# Patient Record
Sex: FEMALE | Race: WHITE | NOT HISPANIC OR LATINO | Employment: FULL TIME | URBAN - METROPOLITAN AREA
[De-identification: names, ages, dates, MRNs, and addresses within clinical notes are randomized per-mention and may not be internally consistent; named-entity substitution may affect disease eponyms.]

---

## 2017-01-25 ENCOUNTER — ALLSCRIPTS OFFICE VISIT (OUTPATIENT)
Dept: OTHER | Facility: OTHER | Age: 52
End: 2017-01-25

## 2017-01-25 DIAGNOSIS — M25.50 PAIN IN JOINT: ICD-10-CM

## 2017-01-25 DIAGNOSIS — E78.5 HYPERLIPIDEMIA: ICD-10-CM

## 2017-01-25 DIAGNOSIS — K21.9 GASTRO-ESOPHAGEAL REFLUX DISEASE WITHOUT ESOPHAGITIS: ICD-10-CM

## 2017-01-25 DIAGNOSIS — M46.1 SACROILIITIS, NOT ELSEWHERE CLASSIFIED (HCC): ICD-10-CM

## 2017-01-25 DIAGNOSIS — M51.36 OTHER INTERVERTEBRAL DISC DEGENERATION, LUMBAR REGION: ICD-10-CM

## 2017-01-27 ENCOUNTER — LAB CONVERSION - ENCOUNTER (OUTPATIENT)
Dept: OTHER | Facility: OTHER | Age: 52
End: 2017-01-27

## 2017-01-27 LAB
A/G RATIO (HISTORICAL): 1.5 (CALC) (ref 1–2.5)
ALBUMIN SERPL BCP-MCNC: 4.2 G/DL (ref 3.6–5.1)
ALP SERPL-CCNC: 73 U/L (ref 33–115)
ALT SERPL W P-5'-P-CCNC: 20 U/L (ref 6–29)
AST SERPL W P-5'-P-CCNC: 20 U/L (ref 10–35)
BASOPHILS # BLD AUTO: 1.2 %
BASOPHILS # BLD AUTO: 91 CELLS/UL (ref 0–200)
BILIRUB SERPL-MCNC: 0.5 MG/DL (ref 0.2–1.2)
BUN SERPL-MCNC: 18 MG/DL (ref 7–25)
BUN/CREA RATIO (HISTORICAL): NORMAL (CALC) (ref 6–22)
CALCIUM SERPL-MCNC: 9.3 MG/DL (ref 8.6–10.2)
CHLORIDE SERPL-SCNC: 104 MMOL/L (ref 98–110)
CHOLEST SERPL-MCNC: 193 MG/DL (ref 125–200)
CHOLEST/HDLC SERPL: 2.7 (CALC)
CO2 SERPL-SCNC: 27 MMOL/L (ref 20–31)
CREAT SERPL-MCNC: 0.92 MG/DL (ref 0.5–1.1)
DEPRECATED RDW RBC AUTO: 15.2 % (ref 11–15)
EGFR AFRICAN AMERICAN (HISTORICAL): 85 ML/MIN/1.73M2
EGFR-AMERICAN CALC (HISTORICAL): 73 ML/MIN/1.73M2
EOSINOPHIL # BLD AUTO: 0.8 %
EOSINOPHIL # BLD AUTO: 61 CELLS/UL (ref 15–500)
ERYTHROCYTE SEDIMENTATION RATE (HISTORICAL): 9 MM/H
GAMMA GLOBULIN (HISTORICAL): 2.8 G/DL (CALC) (ref 1.9–3.7)
GLUCOSE (HISTORICAL): 90 MG/DL (ref 65–99)
HCT VFR BLD AUTO: 39 % (ref 35–45)
HDLC SERPL-MCNC: 71 MG/DL
HGB BLD-MCNC: 12.3 G/DL (ref 11.7–15.5)
LDL CHOLESTEROL (HISTORICAL): 110 MG/DL (CALC)
LYME IGG/IGM AB (HISTORICAL): NORMAL INDEX
LYMPHOCYTES # BLD AUTO: 1892 CELLS/UL (ref 850–3900)
LYMPHOCYTES # BLD AUTO: 24.9 %
MCH RBC QN AUTO: 24.9 PG (ref 27–33)
MCHC RBC AUTO-ENTMCNC: 31.4 G/DL (ref 32–36)
MCV RBC AUTO: 79.2 FL (ref 80–100)
MONOCYTES # BLD AUTO: 555 CELLS/UL (ref 200–950)
MONOCYTES (HISTORICAL): 7.3 %
NEUTROPHILS # BLD AUTO: 5001 CELLS/UL (ref 1500–7800)
NEUTROPHILS # BLD AUTO: 65.8 %
NON-HDL-CHOL (CHOL-HDL) (HISTORICAL): 122 MG/DL (CALC)
PLATELET # BLD AUTO: 324 THOUSAND/UL (ref 140–400)
PMV BLD AUTO: 8.6 FL (ref 7.5–12.5)
POTASSIUM SERPL-SCNC: 4.4 MMOL/L (ref 3.5–5.3)
RBC # BLD AUTO: 4.93 MILLION/UL (ref 3.8–5.1)
SODIUM SERPL-SCNC: 138 MMOL/L (ref 135–146)
TOTAL PROTEIN (HISTORICAL): 7 G/DL (ref 6.1–8.1)
TRIGL SERPL-MCNC: 58 MG/DL
TSH SERPL DL<=0.05 MIU/L-ACNC: 1.35 MIU/L
WBC # BLD AUTO: 7.6 THOUSAND/UL (ref 3.8–10.8)

## 2017-07-11 ENCOUNTER — ALLSCRIPTS OFFICE VISIT (OUTPATIENT)
Dept: OTHER | Facility: OTHER | Age: 52
End: 2017-07-11

## 2017-11-28 ENCOUNTER — GENERIC CONVERSION - ENCOUNTER (OUTPATIENT)
Dept: OTHER | Facility: OTHER | Age: 52
End: 2017-11-28

## 2017-11-28 ENCOUNTER — HOSPITAL ENCOUNTER (OUTPATIENT)
Dept: RADIOLOGY | Age: 52
Discharge: HOME | End: 2017-11-28
Payer: COMMERCIAL

## 2017-11-28 DIAGNOSIS — Z12.31 ENCOUNTER FOR SCREENING MAMMOGRAM FOR MALIGNANT NEOPLASM OF BREAST: ICD-10-CM

## 2017-11-28 PROCEDURE — G0202 SCR MAMMO BI INCL CAD: HCPCS

## 2017-12-19 ENCOUNTER — ALLSCRIPTS OFFICE VISIT (OUTPATIENT)
Dept: OTHER | Facility: OTHER | Age: 52
End: 2017-12-19

## 2017-12-21 NOTE — PROGRESS NOTES
Assessment   1  Cough (786 2) (R05)    Plan   Cough    · Benzonatate 100 MG Oral Capsule; TAKE 1 CAPSULE 3 TIMES DAILY AS    NEEDED    Discussion/Summary      Cough with patient plan to prescribe Benzonatate to help with cough control instructed to call if not better in 72 hours or if symptoms worsen instructed to call for problems or concerns in the interim  The patient was counseled regarding instructions for management,-- risk factor reductions,-- impressions,-- risks and benefits of treatment options  Possible side effects of new medications were reviewed with the patient/guardian today  The treatment plan was reviewed with the patient/guardian  The patient/guardian understands and agrees with the treatment plan      Chief Complaint   1  Cold Symptoms    History of Present Illness   HPI: 50year old female presenting with a scratchy throat and dry nonproductive cough for three weeks  Patient reports having a viral illness that resolved on its own but cough remained  Cold Symptoms: Bronwyn Stanton presents with complaints of cold symptoms  Associated symptoms include nasal congestion,-- scratchy throat-- and-- dry cough, but-- no sneezing,-- no runny nose,-- no post nasal drainage,-- no sore throat,-- no hoarseness,-- no productive cough,-- no facial pressure,-- no facial pain,-- no headache,-- no plugged ear(s),-- no ear pain,-- no swollen lymph nodes,-- no wheezing,-- no shortness of breath,-- no fatigue,-- no weakness,-- no nausea,-- no vomiting,-- no diarrhea,-- no fever-- and-- no chills  Review of Systems        Constitutional: No fever, no chills, feels well, no tiredness, no recent weight gain or loss  ENT: nasal congestion  Cardiovascular: no complaints of slow or fast heart rate, no chest pain, no palpitations, no leg claudication or lower extremity edema  Respiratory: cough  Breasts: no complaints of breast pain, breast lump or nipple discharge  Gastrointestinal: no complaints of abdominal pain, no constipation, no nausea or diarrhea, no vomiting, no bloody stools  Genitourinary: no complaints of dysuria, no incontinence, no pelvic pain, no dysmenorrhea, no vaginal discharge or abnormal vaginal bleeding  Musculoskeletal: no complaints of arthralgia, no myalgia, no joint swelling or stiffness, no limb pain or swelling  Integumentary: no complaints of skin rash or lesion, no itching or dry skin, no skin wounds  Neurological: no complaints of headache, no confusion, no numbness or tingling, no dizziness or fainting  Active Problems   1  Allergic rhinitis (477 9) (J30 9)   2  Annual physical exam (V70 0) (Z00 00)   3  Arthralgia, unspecified joint (719 40) (M25 50)   4  Cervical cancer (180 9) (C53 9)   5  Dermatitis (692 9) (L30 9)   6  Disc degeneration, lumbar (722 52) (M51 36)   7  Encounter for screening mammogram for breast cancer (V76 12) (Z12 31)   8  Esophageal reflux (530 81) (K21 9)   9  Fatigue (780 79) (R53 83)   10  General medical exam (V70 9) (Z00 00)   11  Herniated cervical disc (722 0) (M50 20)   12  Hyperlipidemia (272 4) (E78 5)   13  Palpitations (785 1) (R00 2)   14  Rosacea (695 3) (L71 9)   15  Sacroiliitis (720 2) (M46 1)   16  Spondylosis of lumbar region without myelopathy or radiculopathy (721 3) (M47 816)   17  Throat congestion (784 99) (R68 89)    Past Medical History   1  History of lipoma (V13 89) (Z86 018)  Active Problems And Past Medical History Reviewed: The active problems and past medical history were reviewed and updated today  Family History   Mother    1  Family history of Breast Cancer (V16 3)   2  Family history of Diabetes Mellitus (V18 0)   3  Family history of Ovarian Cancer (V16 41)  Father    4  Family history of Diabetes Mellitus (V18 0)  Family History    5  Family history of Hypertension (V17 49)  Family History Reviewed: The family history was reviewed and updated today  Social History    · Almost always uses seat belt   · Daily Coffee Consumption (___ Cups/Day)   · Denied: History of Daily Cola Consumption (___ Cans/Day)   · Daily Tea Consumption (___ Cups/Day)   · Dental care, regularly   · Exercise: Walking   · Exercise: Yoga   · Full-time employment   ·    · Never A Smoker   · No illicit drug use   · Pets/Animals: Dog   · Pets/Animals: Other   · Sexually active   · Social alcohol use (Z78 9)   · Some college   · Tea   · Water intake, adequate (per day)  The social history was reviewed and updated today  Surgical History   1  History of Cervical Conization By Cold Knife   2  History of  Section   3  History of Radical Synovectomy Of Tendon Sheath Of Palm Or Finger  Surgical History Reviewed: The surgical history was reviewed and updated today  Current Meds    1  Ibuprofen 600 MG Oral Tablet Recorded   2  Multivitamins Oral Capsule Recorded     The medication list was reviewed and updated today  Allergies   1  Codeine Sulfate TABS   2  Codeine Derivatives  3  Mold   4  Trees    Vitals    Recorded: 17JQN3941 01:36PM   Temperature 98 4 F   Heart Rate 76   Systolic 241   Diastolic 70   Height 5 ft 5 in   Weight 185 lb    BMI Calculated 30 79   BSA Calculated 1 91     Physical Exam        Constitutional      General appearance: No acute distress, well appearing and well nourished  Eyes      Conjunctiva and lids: No swelling, erythema or discharge  Pupils and irises: Equal, round and reactive to light  Ears, Nose, Mouth, and Throat      External inspection of ears and nose: Normal        Otoscopic examination: Tympanic membranes translucent with normal light reflex  Canals patent without erythema  Nasal mucosa, septum, and turbinates: Normal without edema or erythema  Oropharynx: Normal with no erythema, edema, exudate or lesions         Pulmonary      Respiratory effort: No increased work of breathing or signs of respiratory distress  Auscultation of lungs: Clear to auscultation  -- dry nonproductive bronchial cough  Cardiovascular      Auscultation of heart: Normal rate and rhythm, normal S1 and S2, without murmurs  Examination of extremities for edema and/or varicosities: Normal        Abdomen      Abdomen: Non-tender, no masses  Musculoskeletal      Digits and nails: Normal without clubbing or cyanosis  Skin      Skin and subcutaneous tissue: Normal without rashes or lesions  Neurologic      Reflexes: 2+ and symmetric  Psychiatric      Orientation to person, place, and time: Normal        Mood and affect: Normal           Future Appointments      Date/Time Provider Specialty Site   02/06/2018 08:00 AM LAMIN Flores   [address] [facility]     Signatures    Electronically signed by : Damian Reed, [address] [place] [address]; Dec 19 2017  2:50PM EST                       (Co-author)     Electronically signed by : LAMIN Dior ; Dec 20 2017  7:24AM EST                       (Author)

## 2018-01-13 VITALS
WEIGHT: 190.38 LBS | RESPIRATION RATE: 16 BRPM | TEMPERATURE: 98.9 F | DIASTOLIC BLOOD PRESSURE: 70 MMHG | HEIGHT: 65 IN | HEART RATE: 72 BPM | SYSTOLIC BLOOD PRESSURE: 116 MMHG | BODY MASS INDEX: 31.72 KG/M2

## 2018-01-13 NOTE — PROGRESS NOTES
Assessment    1  General medical exam (V70 9) (Z00 00)   2  Hyperlipidemia (272 4) (E78 5)    Plan  General medical exam    · (1) CBC/PLT/DIFF; Status:Resulted - Requires Verification;   Done: [id number] 09:53AM   · (1) COMPREHENSIVE METABOLIC PANEL; Status:Resulted - Requires Verification;    Done: 78WIF3549 09:53AM   · (1) LIPID PANEL, FASTING; Status:Resulted - Requires Verification;   Done: 76FTE0134  09:53AM   · (1) TSH; Status:Active; Requested ZFK:[id number];     Discussion/Summary    #1 physical - normal  #2 elevated cholesterol - recheck labs  #3 health maintenance - I reviewed health maintenance issues including but not limited to weight loss, diet and exercise strategies  Discussed need for weight loss nightly family history of diabetes and coronary artery disease  Up-to-date with GYN  Recheck one year or when necessary  Patient to call for problems or concerns in the interim  The treatment plan was reviewed with the patient/guardian  The patient/guardian understands and agrees with the treatment plan   The patient was counseled regarding instructions for management, risk factor reductions, prognosis, patient and family education, impressions, risks and benefits of treatment options, importance of compliance with treatment  Chief Complaint  6 month check up      History of Present Illness  above - here for PE  - pt feels well  Trying to watch what she eats and tries to exercise more  - pt denies CP, palp, lightheadedness or other CV symptoms  - still with scattered sore joints    -up to date with Gyn (November) and mammo  Refuses flu shot  - I reviewed PMHx, PSHx, SocHx and Fam Hx with pt  Review of Systems    Constitutional: No fever, no chills, feels well, no tiredness, no recent weight gain or weight loss  Eyes: No complaints of eye pain, no red eyes, no eyesight problems, no discharge, no dry eyes, no itching of eyes     ENT: no complaints of earache, no loss of hearing, no nose bleeds, no nasal discharge, no sore throat, no hoarseness  Cardiovascular: No complaints of slow heart rate, no fast heart rate, no chest pain, no palpitations, no leg claudication, no lower extremity edema  Respiratory: No complaints of shortness of breath, no wheezing, no cough, no SOB on exertion, no orthopnea, no PND  Gastrointestinal: No complaints of abdominal pain, no constipation, no nausea or vomiting, no diarrhea, no bloody stools  Genitourinary: No complaints of dysuria, no incontinence, no pelvic pain, no dysmenorrhea, no vaginal discharge or bleeding  Musculoskeletal: No complaints of arthralgias, no myalgias, no joint swelling or stiffness, no limb pain or swelling  Integumentary: No complaints of skin rash or lesions, no itching, no skin wounds, no breast pain or lump  Neurological: No complaints of headache, no confusion, no convulsions, no numbness, no dizziness or fainting, no tingling, no limb weakness, no difficulty walking  Psychiatric: Not suicidal, no sleep disturbance, no anxiety or depression, no change in personality, no emotional problems  Endocrine: No complaints of proptosis, no hot flashes, no muscle weakness, no deepening of the voice, no feelings of weakness  Hematologic/Lymphatic: No complaints of swollen glands, no swollen glands in the neck, does not bleed easily, does not bruise easily  Active Problems    1  Abdominal pain, epigastric (789 06) (R10 13)   2  Acute pharyngitis (462) (J02 9)   3  Acute upper respiratory infection (465 9) (J06 9)   4  Allergic rhinitis (477 9) (J30 9)   5  Cervical cancer (180 9) (C53 9)   6  Cough (786 2) (R05)   7  Dermatitis (692 9) (L30 9)   8  Disc degeneration, lumbar (722 52) (M51 36)   9  Esophageal reflux (530 81) (K21 9)   10  Fatigue (780 79) (R53 83)   11  Herniated cervical disc (722 0) (M50 20)   12  Hyperlipidemia (272 4) (E78 5)   13  Lipoma (214 9) (D17 9)   14  Lower back pain (724 2) (M54 5)   15   Myofascial pain syndrome (729 1) (M79 1)   16  Rosacea (695 3) (L71 9)   17  Sacroiliitis (720 2) (M46 1)   18  Spondylosis of lumbar region without myelopathy or radiculopathy (721 3) (M47 816)    Past Medical History    The active problems and past medical history were reviewed and updated today  Surgical History    1  History of Cervical Conization By Cold Knife   2  History of  Section   3  History of Radical Synovectomy Of Tendon Sheath Of Palm Or Finger    Family History    1  Family history of Breast Cancer (V16 3)   2  Family history of Diabetes Mellitus (V18 0)   3  Family history of Ovarian Cancer (V16 41)    4  Family history of Diabetes Mellitus (V18 0)    5  Family history of Hypertension (V17 49)    The family history was reviewed and updated today  Social History    · Alcohol Use (History)   · Daily Coffee Consumption (___ Cups/Day)   · Denied: History of Daily Cola Consumption (___ Cans/Day)   · Daily Tea Consumption (___ Cups/Day)   · Marital History -  (V6 03)   · Never A Smoker  The social history was reviewed and updated today  Current Meds   1  Benzonatate 200 MG Oral Capsule; TAKE 1 CAPSULE 3 TIMES DAILY AS NEEDED; Therapy: [id number] to (Evaluate:2015)  Requested for: 95EJS6117; Last   Rx:2015 Ordered   2  Ibuprofen 600 MG Oral Tablet Recorded   3  Multivitamins Oral Capsule Recorded    The medication list was reviewed and updated today  Allergies    1  Codeine Sulfate TABS   2  Codeine Derivatives    Vitals  Vital Signs [Data Includes: Current Encounter]    Recorded: 2016 09:15AM   Temperature 97 2 F   Heart Rate 80   Systolic 230   Diastolic 64   Height 5 ft 5 in   Weight 190 lb    BMI Calculated 31 62   BSA Calculated 1 94     Physical Exam    Constitutional   General appearance: No acute distress, well appearing and well nourished  Head and Face   Head and face: Normal     Eyes   Conjunctiva and lids: No swelling, erythema or discharge  Pupils and irises: Equal, round, reactive to light  Ophthalmoscopic examination: Normal fundi and optic discs  Ears, Nose, Mouth, and Throat   External inspection of ears and nose: Normal     Otoscopic examination: Tympanic membranes translucent with normal light reflex  Canals patent without erythema  Nasal mucosa, septum, and turbinates: Normal without edema or erythema  Lips, teeth, and gums: Normal, good dentition  Oropharynx: Normal with no erythema, edema, exudate or lesions  Neck   Neck: Supple, symmetric, trachea midline, no masses  Thyroid: Normal, no thyromegaly  Pulmonary   Respiratory effort: No increased work of breathing or signs of respiratory distress  Auscultation of lungs: Clear to auscultation  Cardiovascular   Auscultation of heart: Normal rate and rhythm, normal S1 and S2, no murmurs  Carotid pulses: 2+ bilaterally  Abdominal aorta: Normal     Pedal pulses: 2+ bilaterally  Peripheral vascular exam: Normal     Examination of extremities for edema and/or varicosities: Normal     Abdomen   Abdomen: Non-tender, no masses  Liver and spleen: No hepatomegaly or splenomegaly  Lymphatic   Palpation of lymph nodes in neck: No lymphadenopathy  Palpation of lymph nodes in other areas: No lymphadenopathy  Musculoskeletal   Gait and station: Normal     Digits and nails: Normal without clubbing or cyanosis  Joints, bones, and muscles: Normal     Range of motion: Normal     Muscle strength/tone: Normal     Skin   Skin and subcutaneous tissue: Normal without rashes or lesions  Neurologic   Cranial nerves: Cranial nerves II-XII intact  Cortical function: Normal mental status  Reflexes: 2+ and symmetric  Sensation: No sensory loss  Coordination: Normal finger to nose and heel to shin  Psychiatric   Judgment and insight: Normal     Orientation to person, place, and time: Normal     Recent and remote memory: Intact      Mood and affect: Normal  Results/Data  Encounter Results   (1) CBC/PLT/DIFF [id number] 09:53AM Marisa Bermeo     Test Name Result Flag Reference   WHITE BLOOD CELL COUNT 6 8 Thousand/uL  3 8-10 8   RED BLOOD CELL COUNT 5 03 Million/uL  3 80-5 10   HEMOGLOBIN 12 6 g/dL  11 7-15 5   HEMATOCRIT 40 6 %  35 0-45 0   MCV 80 6 fL  80 0-100 0   MCH 25 0 pg L 27 0-33 0   MCHC 31 1 g/dL L 32 0-36 0   RDW 14 8 %  11 0-15 0   PLATELET COUNT 724 Thousand/uL  140-400   MPV 8 4 fL  7 5-11 5   ABSOLUTE NEUTROPHILS 4542 cells/uL  3739-8765   ABSOLUTE LYMPHOCYTES 1612 cells/uL  850-3900   ABSOLUTE MONOCYTES 564 cells/uL  200-950   ABSOLUTE EOSINOPHILS 54 cells/uL     ABSOLUTE BASOPHILS 27 cells/uL  0-200   NEUTROPHILS 66 8 %     LYMPHOCYTES 23 7 %     MONOCYTES 8 3 %     EOSINOPHILS 0 8 %     BASOPHILS 0 4 %       PHQ-2 Adult Depression Screening 21Jan2016 09:18AM User, Ahs     Test Name Result Flag Reference   PHQ-2 Adult Depression Score 0     Q1: 0, Q2: 0   PHQ-2 Adult Depression Screening Negative         Future Appointments    Date/Time Provider Specialty Site   01/25/2017 08:00 AM [facility] Hooper [facility]     Signatures   Electronically signed by : BONY Bullard ; Jan 22 2016  7:06AM EST                       (Author)

## 2018-01-14 VITALS
RESPIRATION RATE: 14 BRPM | TEMPERATURE: 98 F | BODY MASS INDEX: 30.99 KG/M2 | WEIGHT: 186 LBS | HEIGHT: 65 IN | HEART RATE: 72 BPM | DIASTOLIC BLOOD PRESSURE: 74 MMHG | SYSTOLIC BLOOD PRESSURE: 112 MMHG

## 2018-01-22 VITALS
SYSTOLIC BLOOD PRESSURE: 102 MMHG | WEIGHT: 185 LBS | HEIGHT: 65 IN | DIASTOLIC BLOOD PRESSURE: 70 MMHG | HEART RATE: 76 BPM | TEMPERATURE: 98.4 F | BODY MASS INDEX: 30.82 KG/M2

## 2018-02-06 ENCOUNTER — OFFICE VISIT (OUTPATIENT)
Dept: FAMILY MEDICINE CLINIC | Facility: CLINIC | Age: 53
End: 2018-02-06
Payer: COMMERCIAL

## 2018-02-06 VITALS
TEMPERATURE: 98.7 F | BODY MASS INDEX: 32.09 KG/M2 | WEIGHT: 192.6 LBS | SYSTOLIC BLOOD PRESSURE: 120 MMHG | HEART RATE: 74 BPM | DIASTOLIC BLOOD PRESSURE: 72 MMHG | RESPIRATION RATE: 16 BRPM | HEIGHT: 65 IN

## 2018-02-06 DIAGNOSIS — E55.9 VITAMIN D DEFICIENCY: ICD-10-CM

## 2018-02-06 DIAGNOSIS — M51.36 DISC DEGENERATION, LUMBAR: ICD-10-CM

## 2018-02-06 DIAGNOSIS — Z13.83 SCREENING FOR CARDIOVASCULAR, RESPIRATORY, AND GENITOURINARY DISEASES: ICD-10-CM

## 2018-02-06 DIAGNOSIS — Z13.6 SCREENING FOR CARDIOVASCULAR, RESPIRATORY, AND GENITOURINARY DISEASES: ICD-10-CM

## 2018-02-06 DIAGNOSIS — K21.9 GASTROESOPHAGEAL REFLUX DISEASE, ESOPHAGITIS PRESENCE NOT SPECIFIED: ICD-10-CM

## 2018-02-06 DIAGNOSIS — Z13.89 SCREENING FOR CARDIOVASCULAR, RESPIRATORY, AND GENITOURINARY DISEASES: ICD-10-CM

## 2018-02-06 DIAGNOSIS — E78.2 MIXED HYPERLIPIDEMIA: ICD-10-CM

## 2018-02-06 DIAGNOSIS — Z12.11 SCREENING FOR COLON CANCER: ICD-10-CM

## 2018-02-06 DIAGNOSIS — M47.816 SPONDYLOSIS OF LUMBAR REGION WITHOUT MYELOPATHY OR RADICULOPATHY: ICD-10-CM

## 2018-02-06 DIAGNOSIS — Z00.00 ANNUAL PHYSICAL EXAM: Primary | ICD-10-CM

## 2018-02-06 PROBLEM — F41.9 ANXIETY: Status: ACTIVE | Noted: 2018-02-06

## 2018-02-06 PROCEDURE — 99396 PREV VISIT EST AGE 40-64: CPT | Performed by: FAMILY MEDICINE

## 2018-02-06 RX ORDER — DEXLANSOPRAZOLE 60 MG/1
CAPSULE, DELAYED RELEASE ORAL
COMMUNITY
Start: 2017-07-11 | End: 2019-02-27 | Stop reason: ALTCHOICE

## 2018-02-06 RX ORDER — OSELTAMIVIR PHOSPHATE 75 MG/1
CAPSULE ORAL
COMMUNITY
Start: 2017-12-19 | End: 2019-02-27 | Stop reason: ALTCHOICE

## 2018-02-06 RX ORDER — BENZONATATE 100 MG/1
1 CAPSULE ORAL 3 TIMES DAILY PRN
COMMUNITY
Start: 2017-12-19 | End: 2018-09-10 | Stop reason: SDUPTHER

## 2018-02-06 RX ORDER — IBUPROFEN 600 MG/1
TABLET ORAL
COMMUNITY

## 2018-02-06 NOTE — PROGRESS NOTES
Assessment/Plan:    Esophageal reflux  Stable off of medications  Continue diet and watch  Disc degeneration, lumbar  Continue follow-up with pain management    Spondylosis of lumbar region without myelopathy or radiculopathy  Continue follow-up with pain management    Hyperlipidemia  Check labs    Anxiety  No evidence of depression  Refer for counseling  Diagnoses and all orders for this visit:    Annual physical exam    Gastroesophageal reflux disease, esophagitis presence not specified  -     CBC and differential; Future  -     Comprehensive metabolic panel; Future    Disc degeneration, lumbar    Spondylosis of lumbar region without myelopathy or radiculopathy    Mixed hyperlipidemia  -     Lipid panel; Future  -     TSH, 3rd generation; Future    Screening for colon cancer  -     Ambulatory referral to Gastroenterology; Future    Screening for cardiovascular, respiratory, and genitourinary diseases  -     Comprehensive metabolic panel; Future  -     Lipid panel; Future  -     TSH, 3rd generation; Future    Vitamin D deficiency  Comments:  Check labs  Orders:  -     Vitamin D 1,25 dihydroxy; Future      health maintenance:  Patient referred for colonoscopy  Up-to-date with mammogram and will schedule a follow-up appointment with her gyn  Recheck 1 year or as needed  Patient call for problems or concerns in the interim    Subjective:      Patient ID: Mirna Medina is a 50 y o  female  49 yo female here for yearly PE and f/u several issues  - pt with increased stress  Presently helping to raise her 2 step grandchildren  "this is not where I thought I would be at 50"  PHQ-2 done  Pt denies depression/anhedonia but admits to some anxiety and intermittent irritability  Pt is interested in restarting counseling    - pt continues to f/u with pain management re: chronic low back pain  Continues to have periodic injections for her DDD/spondylosis (last injection 1/29)  No pain/weakness in legs   No change in bowel or bladder  - not exercising regularly  No Cp, lightheadedness or other CV symptoms with or without exertion  - having some menopausal symptoms  Due fo Gyn eval  Up to date with mammo ()  - due for colonoscopy        The following portions of the patient's history were reviewed and updated as appropriate:   She  has a past medical history of Lipoma  She  does not have any pertinent problems on file  She  has a past surgical history that includes Cervical conization w/ laser;  section; and Synovectomy  Her family history includes Breast cancer in her mother; Diabetes in her father and mother; Hypertension in her family; Ovarian cancer in her mother  She  reports that she has never smoked  She does not have any smokeless tobacco history on file  She reports that she drinks alcohol  She reports that she does not use drugs  Current Outpatient Prescriptions   Medication Sig Dispense Refill    ibuprofen (MOTRIN) 600 mg tablet Take by mouth      Multiple Vitamin (MULTIVITAMINS PO) Take by mouth      benzonatate (TESSALON PERLES) 100 mg capsule Take 1 capsule by mouth 3 (three) times a day as needed      dexlansoprazole (DEXILANT) 60 MG capsule Take by mouth      oseltamivir (TAMIFLU) 75 mg capsule        No current facility-administered medications for this visit  She is allergic to codeine       Review of Systems   Constitutional: Negative  HENT: Negative  Eyes: Negative  Respiratory: Negative  Cardiovascular: Negative  Gastrointestinal: Negative  Endocrine: Negative  Genitourinary: Negative  Musculoskeletal: Positive for back pain  Negative for gait problem, myalgias, neck pain and neck stiffness  Skin: Negative  Allergic/Immunologic: Negative  Neurological: Negative  Hematological: Negative  Psychiatric/Behavioral: Positive for sleep disturbance  Negative for agitation, dysphoric mood, hallucinations, self-injury and suicidal ideas   The patient is nervous/anxious  The patient is not hyperactive  Objective:     Physical Exam   Constitutional: She is oriented to person, place, and time  She appears well-developed and well-nourished  HENT:   Head: Normocephalic and atraumatic  Right Ear: External ear normal    Left Ear: External ear normal    Nose: Nose normal    Mouth/Throat: Oropharynx is clear and moist  No oropharyngeal exudate  Eyes: Conjunctivae and EOM are normal  Pupils are equal, round, and reactive to light  Neck: Normal range of motion  Neck supple  No JVD present  No thyromegaly present  Cardiovascular: Normal rate, regular rhythm and intact distal pulses  Exam reveals friction rub  Exam reveals no gallop  No murmur heard  Pulmonary/Chest: Effort normal and breath sounds normal    Abdominal: Soft  She exhibits no distension  There is no tenderness  Musculoskeletal:        Arms:  Lymphadenopathy:     She has no cervical adenopathy  Neurological: She is alert and oriented to person, place, and time  She has normal reflexes  She exhibits normal muscle tone  Coordination normal    Skin: Skin is warm and dry  She is not diaphoretic  Psychiatric: Her behavior is normal  Judgment and thought content normal    PHQ-9 = 0  Rest as above   Vitals reviewed

## 2018-02-06 NOTE — LETTER
Good Morning Lia,     I have a 51 yo female with some increased stress/anxiety recently  Pt is looking for a counselor who has experience in family stress, especially in regards to family members with addiction  Do you have anyone in mind? Could you speak with her?   Thanks    [facility]

## 2018-03-09 ENCOUNTER — TELEPHONE (OUTPATIENT)
Dept: FAMILY MEDICINE CLINIC | Facility: CLINIC | Age: 53
End: 2018-03-09

## 2018-03-09 DIAGNOSIS — R79.9 ABNORMAL BLOOD CHEMISTRY: Primary | ICD-10-CM

## 2018-03-09 LAB
25(OH)D3 SERPL-MCNC: 25 NG/ML (ref 30–100)
ALBUMIN SERPL-MCNC: 4.1 G/DL (ref 3.6–5.1)
ALBUMIN/GLOB SERPL: 1.4 (CALC) (ref 1–2.5)
ALP SERPL-CCNC: 72 U/L (ref 33–130)
ALT SERPL-CCNC: 43 U/L (ref 6–29)
AST SERPL-CCNC: 25 U/L (ref 10–35)
BASOPHILS # BLD AUTO: 29 CELLS/UL (ref 0–200)
BASOPHILS NFR BLD AUTO: 0.4 %
BILIRUB SERPL-MCNC: 0.6 MG/DL (ref 0.2–1.2)
BUN SERPL-MCNC: 14 MG/DL (ref 7–25)
BUN/CREAT SERPL: ABNORMAL (CALC) (ref 6–22)
CALCIUM SERPL-MCNC: 9.2 MG/DL (ref 8.6–10.4)
CHLORIDE SERPL-SCNC: 103 MMOL/L (ref 98–110)
CHOLEST SERPL-MCNC: 215 MG/DL
CHOLEST/HDLC SERPL: 2.7 (CALC)
CO2 SERPL-SCNC: 28 MMOL/L (ref 20–31)
CREAT SERPL-MCNC: 0.99 MG/DL (ref 0.5–1.05)
EOSINOPHIL # BLD AUTO: 29 CELLS/UL (ref 15–500)
EOSINOPHIL NFR BLD AUTO: 0.4 %
ERYTHROCYTE [DISTWIDTH] IN BLOOD BY AUTOMATED COUNT: 15.6 % (ref 11–15)
GLOBULIN SER CALC-MCNC: 2.9 G/DL (CALC) (ref 1.9–3.7)
GLUCOSE SERPL-MCNC: 88 MG/DL (ref 65–99)
HCT VFR BLD AUTO: 38.8 % (ref 35–45)
HDLC SERPL-MCNC: 79 MG/DL
HGB BLD-MCNC: 12.4 G/DL (ref 11.7–15.5)
LDLC SERPL CALC-MCNC: 119 MG/DL (CALC)
LYMPHOCYTES # BLD AUTO: 2074 CELLS/UL (ref 850–3900)
LYMPHOCYTES NFR BLD AUTO: 28.8 %
MCH RBC QN AUTO: 25.6 PG (ref 27–33)
MCHC RBC AUTO-ENTMCNC: 32 G/DL (ref 32–36)
MCV RBC AUTO: 80 FL (ref 80–100)
MONOCYTES # BLD AUTO: 670 CELLS/UL (ref 200–950)
MONOCYTES NFR BLD AUTO: 9.3 %
NEUTROPHILS # BLD AUTO: 4399 CELLS/UL (ref 1500–7800)
NEUTROPHILS NFR BLD AUTO: 61.1 %
NONHDLC SERPL-MCNC: 136 MG/DL (CALC)
PLATELET # BLD AUTO: 389 THOUSAND/UL (ref 140–400)
PMV BLD REES-ECKER: 10 FL (ref 7.5–12.5)
POTASSIUM SERPL-SCNC: 4.6 MMOL/L (ref 3.5–5.3)
PROT SERPL-MCNC: 7 G/DL (ref 6.1–8.1)
RBC # BLD AUTO: 4.85 MILLION/UL (ref 3.8–5.1)
SL AMB EGFR AFRICAN AMERICAN: 77 ML/MIN/1.73M2
SL AMB EGFR NON AFRICAN AMERICAN: 66 ML/MIN/1.73M2
SODIUM SERPL-SCNC: 137 MMOL/L (ref 135–146)
TRIGL SERPL-MCNC: 71 MG/DL
TSH SERPL-ACNC: 1.9 MIU/L
WBC # BLD AUTO: 7.2 THOUSAND/UL (ref 3.8–10.8)

## 2018-04-03 ENCOUNTER — TELEPHONE (OUTPATIENT)
Dept: FAMILY MEDICINE CLINIC | Facility: CLINIC | Age: 53
End: 2018-04-03

## 2018-04-03 NOTE — TELEPHONE ENCOUNTER
PT TOLD TO GET MORE LABS DONE AFTER CHANGING VITAMINS AND MONITORING INTAKE ON IBUPROFEN WILL GO TO [facility] FOR LABS

## 2018-04-04 NOTE — TELEPHONE ENCOUNTER
AST, ALT and ALP are already ordered   Pt just needs to go to [facility] lab - does not need a lab slip

## 2018-04-06 ENCOUNTER — TELEPHONE (OUTPATIENT)
Dept: FAMILY MEDICINE CLINIC | Facility: CLINIC | Age: 53
End: 2018-04-06

## 2018-04-06 DIAGNOSIS — E55.9 VITAMIN D DEFICIENCY: Primary | ICD-10-CM

## 2018-04-06 NOTE — TELEPHONE ENCOUNTER
The order was actually in the computer - I am not sure why they could not see it, but I did re-enter it

## 2018-04-06 NOTE — TELEPHONE ENCOUNTER
She was suppose to repeat Vitamin D, but got to lab and there is not order, could you please put one in, could you mail it to patient

## 2018-04-13 LAB
25(OH)D3 SERPL-MCNC: 27 NG/ML (ref 30–100)
ALP SERPL-CCNC: 77 U/L (ref 33–130)
ALT SERPL-CCNC: 29 U/L (ref 6–29)
AST SERPL-CCNC: 24 U/L (ref 10–35)

## 2018-08-31 ENCOUNTER — OFFICE VISIT (OUTPATIENT)
Dept: FAMILY MEDICINE CLINIC | Facility: CLINIC | Age: 53
End: 2018-08-31
Payer: COMMERCIAL

## 2018-08-31 ENCOUNTER — TELEPHONE (OUTPATIENT)
Dept: FAMILY MEDICINE CLINIC | Facility: CLINIC | Age: 53
End: 2018-08-31

## 2018-08-31 VITALS
BODY MASS INDEX: 31.09 KG/M2 | SYSTOLIC BLOOD PRESSURE: 112 MMHG | DIASTOLIC BLOOD PRESSURE: 90 MMHG | HEART RATE: 72 BPM | TEMPERATURE: 95.1 F | WEIGHT: 186.6 LBS | HEIGHT: 65 IN

## 2018-08-31 DIAGNOSIS — L25.9 CONTACT DERMATITIS, UNSPECIFIED CONTACT DERMATITIS TYPE, UNSPECIFIED TRIGGER: Primary | ICD-10-CM

## 2018-08-31 PROCEDURE — 99213 OFFICE O/P EST LOW 20 MIN: CPT | Performed by: FAMILY MEDICINE

## 2018-08-31 RX ORDER — PREDNISONE 20 MG/1
20 TABLET ORAL 2 TIMES DAILY WITH MEALS
Qty: 12 TABLET | Refills: 0 | Status: SHIPPED | OUTPATIENT
Start: 2018-08-31 | End: 2019-02-27 | Stop reason: ALTCHOICE

## 2018-08-31 NOTE — PROGRESS NOTES
Assessment/Plan:      Diagnoses and all orders for this visit:    Contact dermatitis, unspecified contact dermatitis type, unspecified trigger  Comments:  Distribution suggests contact dermatitis  Start prednisone as directed  Review topical care  Recheck 1 week if not improved- earlier if worse  Orders:  -     predniSONE 20 mg tablet; Take 1 tablet (20 mg total) by mouth 2 (two) times a day with meals          Subjective:     Patient ID: Raul Giles is a 48 y o  female  A day history of slowly spreading pruritic rash  Starts as small water blister that then developed into a reasonable within minutes lesion  Start on lower legs and extended to the upper legs as well as her arms  Patient has not noticed any lesions on her trunk, neck or face  Patient was 4 wheeling 4 days prior to onset of rash  Patient was also working outside on her deck prior to the rash onset  No one else involved in these activities has a rash  Review of Systems   Constitutional: Negative  HENT: Negative  Respiratory: Negative  Cardiovascular: Negative  Musculoskeletal: Negative  Skin: Positive for rash  Negative for pallor and wound  Neurological: Negative  Objective:     Physical Exam   Constitutional: She is oriented to person, place, and time  She appears well-developed and well-nourished  HENT:   Head: Atraumatic  Mouth/Throat: Oropharynx is clear and moist    Neck: Normal range of motion  Musculoskeletal: Normal range of motion  Lymphadenopathy:     She has no cervical adenopathy  Neurological: She is alert and oriented to person, place, and time  Skin: Rash (Scattered clusters of small vesicles/papules with erythema but no drainage  No pustules appreciated  No other significant findings ) noted

## 2018-08-31 NOTE — TELEPHONE ENCOUNTER
She has a rash for 1 wk, started on legs, now on arms, little blisters, red and itchy,     Pl adv    [facility] [place],

## 2018-09-10 ENCOUNTER — OFFICE VISIT (OUTPATIENT)
Dept: FAMILY MEDICINE CLINIC | Facility: CLINIC | Age: 53
End: 2018-09-10
Payer: COMMERCIAL

## 2018-09-10 ENCOUNTER — APPOINTMENT (OUTPATIENT)
Dept: RADIOLOGY | Age: 53
End: 2018-09-10
Payer: COMMERCIAL

## 2018-09-10 VITALS
TEMPERATURE: 98.3 F | DIASTOLIC BLOOD PRESSURE: 76 MMHG | SYSTOLIC BLOOD PRESSURE: 110 MMHG | BODY MASS INDEX: 31.42 KG/M2 | WEIGHT: 188.6 LBS | HEART RATE: 92 BPM | HEIGHT: 65 IN

## 2018-09-10 DIAGNOSIS — J18.0 BRONCHOPNEUMONIA: ICD-10-CM

## 2018-09-10 DIAGNOSIS — J18.0 BRONCHOPNEUMONIA: Primary | ICD-10-CM

## 2018-09-10 PROCEDURE — 71046 X-RAY EXAM CHEST 2 VIEWS: CPT

## 2018-09-10 PROCEDURE — 3008F BODY MASS INDEX DOCD: CPT | Performed by: FAMILY MEDICINE

## 2018-09-10 PROCEDURE — 99213 OFFICE O/P EST LOW 20 MIN: CPT | Performed by: FAMILY MEDICINE

## 2018-09-10 RX ORDER — AZITHROMYCIN 250 MG/1
TABLET, FILM COATED ORAL
Qty: 6 TABLET | Refills: 0 | Status: SHIPPED | OUTPATIENT
Start: 2018-09-10 | End: 2018-09-14

## 2018-09-10 RX ORDER — BENZONATATE 100 MG/1
100 CAPSULE ORAL 3 TIMES DAILY PRN
Qty: 30 CAPSULE | Refills: 0 | Status: SHIPPED | OUTPATIENT
Start: 2018-09-10 | End: 2019-02-27 | Stop reason: ALTCHOICE

## 2018-09-10 NOTE — PROGRESS NOTES
Assessment/Plan:      Diagnoses and all orders for this visit:    Bronchopneumonia  Comments:  Rhonchi, rare wheeze and rales in the upper lobes bilaterally  Start Zithromax, tessalon and Breo  Check chest x-ray  F/U 3 days if not improved  Orders:  -     azithromycin (ZITHROMAX) 250 mg tablet; 2 tab today then 1 tab po qd x 4 d  -     XR chest pa & lateral; Future  -     benzonatate (TESSALON PERLES) 100 mg capsule; Take 1 capsule (100 mg total) by mouth 3 (three) times a day as needed (cough)          Subjective:     Patient ID: Ria Flynn is a 48 y o  female  One-week history of total like cough that seems to be getting little bit worse  Cough is now productive of yellow mucus  It seems to be in the upper chest   There is no shortness of breath, nasal congestion, sinus pain or other associated problems  Patient denies any fever chills  Patient does not smoke  Review of Systems   Constitutional: Negative  HENT: Negative  Eyes: Negative  Respiratory: Positive for cough  Negative for apnea, choking, shortness of breath and wheezing  Cardiovascular: Negative  Objective:     Physical Exam   Constitutional: She appears well-developed and well-nourished  HENT:   Head: Normocephalic and atraumatic  Right Ear: External ear normal    Left Ear: External ear normal    Nose: Nose normal    Mouth/Throat: Oropharynx is clear and moist    Eyes: Conjunctivae and EOM are normal  Pupils are equal, round, and reactive to light  Neck: Normal range of motion  Cardiovascular: Normal rate, regular rhythm and intact distal pulses  Pulmonary/Chest: She has wheezes in the right upper field and the left upper field  She has rhonchi in the right upper field and the left upper field  She has rales in the right upper field and the left upper field  Lymphadenopathy:     She has no cervical adenopathy

## 2018-10-11 ENCOUNTER — TELEPHONE (OUTPATIENT)
Dept: FAMILY MEDICINE CLINIC | Facility: CLINIC | Age: 53
End: 2018-10-11

## 2018-10-11 NOTE — TELEPHONE ENCOUNTER
She took tessalon pearls, inhaler, and prednisone, and zpak, September 10th  Had xray  She still has non productive cough  No other symptoms at all  Allergy:  Codeine  [employer]  Please advise

## 2018-10-12 DIAGNOSIS — R05.3 CHRONIC COUGH: Primary | ICD-10-CM

## 2018-10-26 ENCOUNTER — OFFICE VISIT (OUTPATIENT)
Dept: URGENT CARE | Facility: CLINIC | Age: 53
End: 2018-10-26
Payer: COMMERCIAL

## 2018-10-26 VITALS
BODY MASS INDEX: 30.82 KG/M2 | TEMPERATURE: 97.7 F | WEIGHT: 185 LBS | DIASTOLIC BLOOD PRESSURE: 81 MMHG | HEART RATE: 80 BPM | SYSTOLIC BLOOD PRESSURE: 138 MMHG | HEIGHT: 65 IN | OXYGEN SATURATION: 100 % | RESPIRATION RATE: 20 BRPM

## 2018-10-26 DIAGNOSIS — R05.9 COUGH: Primary | ICD-10-CM

## 2018-10-26 PROCEDURE — 99203 OFFICE O/P NEW LOW 30 MIN: CPT | Performed by: NURSE PRACTITIONER

## 2018-10-26 RX ORDER — CETIRIZINE HYDROCHLORIDE 10 MG/1
10 TABLET ORAL DAILY
COMMUNITY
End: 2022-01-19

## 2018-10-26 NOTE — PROGRESS NOTES
[facility]        NAME: Franklyn Almaraz is a 48 y o  female  : 1965    MRN: [id number]  DATE: 2018  TIME: 12:04 PM    Assessment and Plan   Cough [R05]  1  Cough           Patient Instructions     Cough could be related to postnasal drip due to history of environmental/seasonal allergies  She states that in the past she has taken Zyrtec, but has not taken it recently  I advised her to start taking Zyrtec on a daily basis as well as Flonase nasal spray  Follow-up with PCP if symptoms persist   Follow up with PCP in 3-5 days  Proceed to  ER if symptoms worsen  Chief Complaint     Chief Complaint   Patient presents with    Cough     cough for 7 weeks, Had pneumonia Sept 10 and cough never improved         History of Present Illness       Cough   Episode onset: 6 weeks  The cough is non-productive  Pertinent negatives include no ear congestion, ear pain, nasal congestion, sore throat, shortness of breath or wheezing  She has tried prescription cough suppressant and oral steroids (Z-zunilda) for the symptoms  Improvement on treatment: Cough is no longer productive  She did have some green sputum prior to Z-Zunilda  Her past medical history is significant for environmental allergies  Review of Systems   Review of Systems   Constitutional: Negative  HENT: Negative for ear pain and sore throat  Eyes: Negative  Respiratory: Positive for cough (Dry, nonproductive)  Negative for shortness of breath and wheezing  Cardiovascular: Negative  Gastrointestinal: Negative  Endocrine: Negative  Genitourinary: Negative  Musculoskeletal: Negative  Allergic/Immunologic: Positive for environmental allergies  Neurological: Negative  Psychiatric/Behavioral: Negative            Current Medications       Current Outpatient Prescriptions:     benzonatate (TESSALON PERLES) 100 mg capsule, Take 1 capsule (100 mg total) by mouth 3 (three) times a day as needed (cough), Disp: 30 capsule, Rfl: 0    cetirizine (ZyrTEC) 10 mg tablet, Take 10 mg by mouth daily, Disp: , Rfl:     Multiple Vitamin (MULTIVITAMINS PO), Take by mouth, Disp: , Rfl:     dexlansoprazole (DEXILANT) 60 MG capsule, Take by mouth, Disp: , Rfl:     ibuprofen (MOTRIN) 600 mg tablet, Take by mouth, Disp: , Rfl:     oseltamivir (TAMIFLU) 75 mg capsule, , Disp: , Rfl:     predniSONE 20 mg tablet, Take 1 tablet (20 mg total) by mouth 2 (two) times a day with meals (Patient not taking: Reported on 9/10/2018 ), Disp: 12 tablet, Rfl: 0    Current Allergies     Allergies as of 10/26/2018 - Reviewed 10/26/2018   Allergen Reaction Noted    Codeine Anaphylaxis 01/10/2013            The following portions of the patient's history were reviewed and updated as appropriate: allergies, current medications, past family history, past medical history, past social history, past surgical history and problem list      Past Medical History:   Diagnosis Date    Lipoma     last assessed  Dereck Simple [place] Simple Dereck Simple 14   resolved    17         Past Surgical History:   Procedure Laterality Date    CERVICAL CONIZATION   W/ LASER      by cold knife     SECTION      , ,       SYNOVECTOMY      radical synovectomy of tendon sheath of palm or finger   trigger thumb release in      THUMB FUSION         Family History   Problem Relation Age of Onset   Nayely De Leon Breast cancer Mother     Diabetes Mother     Ovarian cancer Mother     Diabetes Father     Hypertension Family          Medications have been verified  Objective   /81   Pulse 80   Temp 97 7 °F (36 5 °C) (Tympanic)   Resp 20   Ht 5' 5" (1 651 m)   Wt 83 9 kg (185 lb)   LMP 2018   SpO2 100%   BMI 30 79 kg/m²        Physical Exam     Physical Exam   Constitutional: She is oriented to person, place, and time  She appears well-developed and well-nourished  No distress  HENT:   Head: Normocephalic and atraumatic     Right Ear: External ear normal    Left Ear: External ear normal    Nose: Mucosal edema and rhinorrhea present  Right sinus exhibits no maxillary sinus tenderness and no frontal sinus tenderness  Left sinus exhibits no maxillary sinus tenderness and no frontal sinus tenderness  Mouth/Throat: Posterior oropharyngeal erythema present  No oropharyngeal exudate or posterior oropharyngeal edema  Eyes: Pupils are equal, round, and reactive to light  Conjunctivae and EOM are normal    Neck: Normal range of motion  Neck supple  Cardiovascular: Normal rate, regular rhythm and normal heart sounds  Pulmonary/Chest: Effort normal and breath sounds normal  No respiratory distress  She has no wheezes  She has no rales  Abdominal: Soft  Bowel sounds are normal    Lymphadenopathy:     She has no cervical adenopathy  Neurological: She is alert and oriented to person, place, and time  She has normal reflexes  Skin: Skin is warm and dry  She is not diaphoretic  Psychiatric: She has a normal mood and affect  Her behavior is normal  Judgment and thought content normal    Nursing note and vitals reviewed

## 2018-10-26 NOTE — PATIENT INSTRUCTIONS
Acute Cough   WHAT YOU NEED TO KNOW:   An acute cough can last up to 3 weeks  Common causes of an acute cough include a cold, allergies, or a lung infection  DISCHARGE INSTRUCTIONS:   Return to the emergency department if:   · You have trouble breathing or feel short of breath  · You cough up blood, or you see blood in your mucus  · You faint or feel weak or dizzy  · You have chest pain when you cough or take a deep breath  · You have new wheezing  Contact your healthcare provider if:   · You have a fever  · Your cough lasts longer than 4 weeks  · Your symptoms do not improve with treatment  · You have questions or concerns about your condition or care  Medicines:   · Medicines  may be needed to stop the cough, decrease swelling in your airways, or help open your airways  Medicine may also be given to help you cough up mucus  Ask your healthcare provider what over-the-counter medicines you can take  If you have an infection caused by bacteria, you may need antibiotics  · Take your medicine as directed  Contact your healthcare provider if you think your medicine is not helping or if you have side effects  Tell him or her if you are allergic to any medicine  Keep a list of the medicines, vitamins, and herbs you take  Include the amounts, and when and why you take them  Bring the list or the pill bottles to follow-up visits  Carry your medicine list with you in case of an emergency  Manage your symptoms:   · Do not smoke and stay away from others who smoke  Nicotine and other chemicals in cigarettes and cigars can cause lung damage and make your cough worse  Ask your healthcare provider for information if you currently smoke and need help to quit  E-cigarettes or smokeless tobacco still contain nicotine  Talk to your healthcare provider before you use these products  · Drink extra liquids as directed  Liquids will help thin and loosen mucus so you can cough it up   Liquids will also help prevent dehydration  Examples of good liquids to drink include water, fruit juice, and broth  Do not drink liquids that contain caffeine  Caffeine can increase your risk for dehydration  Ask your healthcare provider how much liquid to drink each day  · Rest as directed  Do not do activities that make your cough worse, such as exercise  · Use a humidifier or vaporizer  Use a cool mist humidifier or a vaporizer to increase air moisture in your home  This may make it easier for you to breathe and help decrease your cough  · Eat 2 to 5 mL of honey 2 times each day  Honey can help thin mucus and decrease your cough  · Use cough drops or lozenges  These can help decrease throat irritation and your cough  Follow up with your healthcare provider as directed:  Write down your questions so you remember to ask them during your visits  © 2017 [address] [employer] Information is for End User's use only and may not be sold, redistributed or otherwise used for commercial purposes  All illustrations and images included in CareNotes® are the copyrighted property of A D A M , Inc  or Gregor Hdez  The above information is an  only  It is not intended as medical advice for individual conditions or treatments  Talk to your doctor, nurse or pharmacist before following any medical regimen to see if it is safe and effective for you

## 2018-11-29 ENCOUNTER — TELEPHONE (OUTPATIENT)
Dept: FAMILY MEDICINE CLINIC | Facility: CLINIC | Age: 53
End: 2018-11-29

## 2018-11-29 ENCOUNTER — HOSPITAL ENCOUNTER (OUTPATIENT)
Dept: RADIOLOGY | Age: 53
Discharge: HOME | End: 2018-11-29
Payer: COMMERCIAL

## 2018-11-29 VITALS — HEIGHT: 65 IN | WEIGHT: 185 LBS | BODY MASS INDEX: 30.82 KG/M2

## 2018-11-29 DIAGNOSIS — Z12.39 SCREENING FOR BREAST CANCER: ICD-10-CM

## 2018-11-29 DIAGNOSIS — Z12.39 SCREENING FOR BREAST CANCER: Primary | ICD-10-CM

## 2018-11-29 PROCEDURE — 77067 SCR MAMMO BI INCL CAD: CPT

## 2019-02-27 ENCOUNTER — OFFICE VISIT (OUTPATIENT)
Dept: FAMILY MEDICINE CLINIC | Facility: CLINIC | Age: 54
End: 2019-02-27
Payer: COMMERCIAL

## 2019-02-27 VITALS
HEIGHT: 65 IN | HEART RATE: 78 BPM | BODY MASS INDEX: 32.12 KG/M2 | WEIGHT: 192.8 LBS | TEMPERATURE: 98.7 F | SYSTOLIC BLOOD PRESSURE: 122 MMHG | DIASTOLIC BLOOD PRESSURE: 80 MMHG

## 2019-02-27 DIAGNOSIS — Z13.89 SCREENING FOR CARDIOVASCULAR, RESPIRATORY, AND GENITOURINARY DISEASES: ICD-10-CM

## 2019-02-27 DIAGNOSIS — Z13.83 SCREENING FOR CARDIOVASCULAR, RESPIRATORY, AND GENITOURINARY DISEASES: ICD-10-CM

## 2019-02-27 DIAGNOSIS — Z13.6 SCREENING FOR CARDIOVASCULAR, RESPIRATORY, AND GENITOURINARY DISEASES: ICD-10-CM

## 2019-02-27 DIAGNOSIS — E78.2 MIXED HYPERLIPIDEMIA: ICD-10-CM

## 2019-02-27 DIAGNOSIS — M51.36 DISC DEGENERATION, LUMBAR: ICD-10-CM

## 2019-02-27 DIAGNOSIS — Z00.00 ANNUAL PHYSICAL EXAM: Primary | ICD-10-CM

## 2019-02-27 DIAGNOSIS — F33.0 MILD EPISODE OF RECURRENT MAJOR DEPRESSIVE DISORDER (HCC): ICD-10-CM

## 2019-02-27 DIAGNOSIS — E66.9 OBESITY (BMI 30.0-34.9): ICD-10-CM

## 2019-02-27 PROCEDURE — 99396 PREV VISIT EST AGE 40-64: CPT | Performed by: FAMILY MEDICINE

## 2019-02-27 RX ORDER — VALACYCLOVIR HYDROCHLORIDE 500 MG/1
TABLET, FILM COATED ORAL
COMMUNITY
Start: 2019-02-12 | End: 2020-11-25 | Stop reason: DRUGHIGH

## 2019-02-27 NOTE — PROGRESS NOTES
Assessment/Plan:    Disc degeneration, lumbar  Persistent  Cont f/u with pain managment    Hyperlipidemia  Check labs  Discussed diet  Recheck 1 year or prn    Mild episode of recurrent major depressive disorder (HCC)  Mild  Discussed treatment options  Pt agrees to try counseling - can obtain through work  Recheck 1 year - earlier if not improving/worsening       Diagnoses and all orders for this visit:    Annual physical exam    Mixed hyperlipidemia  -     TSH, 3rd generation with Free T4 reflex; Future    Obesity (BMI 30 0-34  9)    Mild episode of recurrent major depressive disorder (HCC)  -     CBC and differential; Future  -     TSH, 3rd generation with Free T4 reflex; Future    Screening for cardiovascular, respiratory, and genitourinary diseases  -     Comprehensive metabolic panel; Future  -     Lipid panel; Future    Disc degeneration, lumbar    Other orders  -     valACYclovir (VALTREX) 500 mg tablet          Subjective:      Patient ID: Gil Contreras is a 48 y o  female  56yo female here for yearly PE and f/u several issues  - pt still with increased stress   struggling with family issues  Pt admits to feeling depressed  PHQ done  - still having chronic low back issues  Had injections in Jan with good effect but now has some upper back issues (?new bed related)  No radiation  No pain/weakness in legs  No change in bowel or bladder  - not exercising as regularly as she was  No Cp, lightheadedness or other CV symptoms with or without exertion  - still with sporadic menstrual cycle  Up to date with mammo (11/18)  - had colonoscopy last year  - still working on diet  Working on a low [employer]  Not exercising regularly     - up to date with dentist and gyn  - I reviewed PMHx, PSHx, Fam Hx and Soc Hx with pt    - full ROS done      The following portions of the patient's history were reviewed and updated as appropriate:   She  has a past medical history of Cervical cancer ([facility] [address] ) and Lipoma  She   Patient Active Problem List    Diagnosis Date Noted    [facility] of [facility]    Annual physical exam 2018    Anxiety 2018    Esophageal reflux 2014    Allergic rhinitis 2014    Herniated cervical disc 2013    Disc degeneration, lumbar 2013    Spondylosis of lumbar region without myelopathy or radiculopathy 2013    Rosacea 2013    Cervical cancer ([facility] ) 2013    Hyperlipidemia 2013     She  has a past surgical history that includes Cervical conization w/ laser;  section; Synovectomy; Thumb fusion; and Tubal ligation (Bilateral)  She  reports that she has never smoked  She has never used smokeless tobacco  She reports that she drinks alcohol  She reports that she does not use drugs  Current Outpatient Medications   Medication Sig Dispense Refill    cetirizine (ZyrTEC) 10 mg tablet Take 10 mg by mouth daily      ibuprofen (MOTRIN) 600 mg tablet Take by mouth      Multiple Vitamin (MULTIVITAMINS PO) Take by mouth      valACYclovir (VALTREX) 500 mg tablet        No current facility-administered medications for this visit  She is allergic to codeine       Review of Systems   Constitutional: Negative  HENT: Negative  Eyes: Negative  Respiratory: Negative  Cardiovascular: Negative  Gastrointestinal: Negative  Endocrine: Negative  Genitourinary: Negative  Musculoskeletal: Positive for back pain  Negative for gait problem, myalgias, neck pain and neck stiffness  Skin: Negative  Allergic/Immunologic: Negative  Neurological: Negative  Hematological: Negative  Psychiatric/Behavioral: Positive for dysphoric mood and sleep disturbance  Negative for agitation, hallucinations, self-injury and suicidal ideas  The patient is nervous/anxious  The patient is not hyperactive            Objective:      /80 (BP Location: Right arm, Patient Position: Sitting, Cuff Size: Standard)   Pulse 78   Temp 98 7 °F (37 1 °C)   Ht 5' 5" (1 651 m)   Wt 87 5 kg (192 lb 12 8 oz)   BMI 32 08 kg/m²          Physical Exam   Constitutional: She is oriented to person, place, and time  She appears well-developed and well-nourished  HENT:   Head: Normocephalic and atraumatic  Right Ear: External ear normal    Left Ear: External ear normal    Nose: Nose normal    Mouth/Throat: Oropharynx is clear and moist  No oropharyngeal exudate  Eyes: Pupils are equal, round, and reactive to light  Conjunctivae and EOM are normal    Neck: Normal range of motion  Neck supple  No JVD present  No thyromegaly present  Cardiovascular: Normal rate, regular rhythm and intact distal pulses  Exam reveals no gallop and no friction rub  No murmur heard  Pulmonary/Chest: Effort normal and breath sounds normal    Abdominal: Soft  She exhibits no distension  There is no tenderness  Musculoskeletal:        Arms:  Lymphadenopathy:     She has no cervical adenopathy  Neurological: She is alert and oriented to person, place, and time  She has normal reflexes  She exhibits normal muscle tone  Coordination normal    Skin: Skin is warm  She is not diaphoretic  Psychiatric: Her behavior is normal  Judgment and thought content normal    PHQ-9 = 8 (mild depression)   Vitals reviewed  BMI Counseling: Body mass index is 32 08 kg/m²  Discussed the patient's BMI with her  The BMI is above average  BMI counseling and education was provided to the patient  Nutrition recommendations include reducing portion sizes, 3-5 servings of fruits/vegetables daily, moderation in carbohydrate intake and increasing intake of lean protein  Exercise recommendations include exercising 3-5 times per week

## 2019-03-01 NOTE — ASSESSMENT & PLAN NOTE
Mild  Discussed treatment options  Pt agrees to try counseling - can obtain through work   Recheck 1 year - earlier if not improving/worsening

## 2019-05-07 DIAGNOSIS — R05.9 COUGH: Primary | ICD-10-CM

## 2019-05-07 RX ORDER — BENZONATATE 200 MG/1
200 CAPSULE ORAL 3 TIMES DAILY PRN
Qty: 30 CAPSULE | Refills: 3 | Status: SHIPPED | OUTPATIENT
Start: 2019-05-07 | End: 2019-07-03

## 2019-07-03 ENCOUNTER — OFFICE VISIT (OUTPATIENT)
Dept: FAMILY MEDICINE CLINIC | Facility: CLINIC | Age: 54
End: 2019-07-03
Payer: COMMERCIAL

## 2019-07-03 VITALS
DIASTOLIC BLOOD PRESSURE: 82 MMHG | WEIGHT: 195 LBS | HEART RATE: 74 BPM | BODY MASS INDEX: 32.49 KG/M2 | HEIGHT: 65 IN | SYSTOLIC BLOOD PRESSURE: 120 MMHG | TEMPERATURE: 98.6 F

## 2019-07-03 DIAGNOSIS — L03.012 PARONYCHIA OF LEFT MIDDLE FINGER: Primary | ICD-10-CM

## 2019-07-03 PROCEDURE — 99213 OFFICE O/P EST LOW 20 MIN: CPT | Performed by: NURSE PRACTITIONER

## 2019-07-03 PROCEDURE — 3008F BODY MASS INDEX DOCD: CPT | Performed by: NURSE PRACTITIONER

## 2019-07-03 PROCEDURE — 1036F TOBACCO NON-USER: CPT | Performed by: NURSE PRACTITIONER

## 2019-07-03 RX ORDER — CEPHALEXIN 500 MG/1
500 CAPSULE ORAL 3 TIMES DAILY
Qty: 30 CAPSULE | Refills: 0 | Status: SHIPPED | OUTPATIENT
Start: 2019-07-03 | End: 2019-07-13

## 2019-07-03 NOTE — PROGRESS NOTES
Assessment/Plan:     Diagnoses and all orders for this visit:    Paronychia of left middle finger  -     cephalexin (KEFLEX) 500 mg capsule; Take 1 capsule (500 mg total) by mouth 3 (three) times a day for 10 days    Discussed with patient plan to treat with 10 day course of cephalexin  Discussed with patient conservative measures to treat: soak infected finger in warm water 3 to 4 times a day to reduce swelling, redness, pain  Patient instructed to call if no improvement in 72 hours or symptoms worsen      Subjective:      Patient ID: Raul Peralta is a 48 y o  female  48 y  o female presenting with bump on left middle finger for the past two weeks  She was working in the yard and thinks that she had a nancy thorn stick her under the nail  The nail as become increasing tender, red and mild swelling  She as not treated the symptoms  Her  told her it was the beginning of arthritis        Family History   Problem Relation Age of Onset   Chun Reardon Breast cancer Mother 59    Diabetes Mother     Ovarian cancer Mother 39    Diabetes Father     Hypertension Family     Breast cancer Cousin 52     Social History     Socioeconomic History    Marital status: /Civil Union     Spouse name: Not on file    Number of children: Not on file    Years of education: Not on file    Highest education level: Not on file   Occupational History     Comment: full time employment   Social Needs    Financial resource strain: Not on file    Food insecurity:     Worry: Not on file     Inability: Not on file    Transportation needs:     Medical: Not on file     Non-medical: Not on file   Tobacco Use    Smoking status: Never Smoker    Smokeless tobacco: Never Used   Substance and Sexual Activity    Alcohol use: Yes     Comment: social     Drug use: No    Sexual activity: Yes   Lifestyle    Physical activity:     Days per week: Not on file     Minutes per session: Not on file    Stress: Not on file   Relationships    Social connections:     Talks on phone: Not on file     Gets together: Not on file     Attends Gnosticism service: Not on file     Active member of club or organization: Not on file     Attends meetings of clubs or organizations: Not on file     Relationship status: Not on file    Intimate partner violence:     Fear of current or ex partner: Not on file     Emotionally abused: Not on file     Physically abused: Not on file     Forced sexual activity: Not on file   Other Topics Concern    Not on file   Social History Narrative    Almost always uses seat belts    Daily coffee consumption     Daily tea consumption     Dental care regularly    Exercise walking    Exercise yoga    Pet animals dog    Some college    Tea    Water intake adequate per day     Past Medical History:   Diagnosis Date    Cervical cancer [facility])     age 27    Lipoma     last assessed  Viramontes Spruce Viramontes Spruce Viramontes Spruce 16   resolved    19       Past Surgical History:   Procedure Laterality Date    CERVICAL CONIZATION   W/ LASER      by cold knife     SECTION      , ,       SYNOVECTOMY      radical synovectomy of tendon sheath of palm or finger   trigger thumb release in      THUMB FUSION      TUBAL LIGATION Bilateral      Allergies   Allergen Reactions    Codeine Anaphylaxis       Current Outpatient Medications:     cephalexin (KEFLEX) 500 mg capsule, Take 1 capsule (500 mg total) by mouth 3 (three) times a day for 10 days, Disp: 30 capsule, Rfl: 0    cetirizine (ZyrTEC) 10 mg tablet, Take 10 mg by mouth daily, Disp: , Rfl:     ibuprofen (MOTRIN) 600 mg tablet, Take by mouth, Disp: , Rfl:     Multiple Vitamin (MULTIVITAMINS PO), Take by mouth, Disp: , Rfl:     valACYclovir (VALTREX) 500 mg tablet, , Disp: , Rfl:       Review of Systems   Constitutional: Negative  Respiratory: Negative  Cardiovascular: Negative  Musculoskeletal: Positive for myalgias     Skin:        Right middle finger nail bed red and swollen Neurological: Negative  Psychiatric/Behavioral: Negative  Objective:    /82 (BP Location: Left arm, Patient Position: Sitting, Cuff Size: Standard)   Pulse 74   Temp 98 6 °F (37 °C)   Ht 5' 5" (1 651 m)   Wt 88 5 kg (195 lb)   BMI 32 45 kg/m² (Reviewed)     Physical Exam   Constitutional: She is oriented to person, place, and time  Vital signs are normal  She appears well-developed and well-nourished  HENT:   Head: Normocephalic and atraumatic  Eyes: Pupils are equal, round, and reactive to light  Conjunctivae, EOM and lids are normal    Neck: Trachea normal  Neck supple  Cardiovascular: Normal rate, regular rhythm and intact distal pulses  Pulmonary/Chest: Effort normal and breath sounds normal    Musculoskeletal:        Hands:  Neurological: She is alert and oriented to person, place, and time  Skin: Skin is warm and dry  Capillary refill takes less than 2 seconds  Psychiatric: She has a normal mood and affect   Her behavior is normal

## 2019-09-20 LAB
ALBUMIN SERPL-MCNC: 4.4 G/DL (ref 3.6–5.1)
ALBUMIN/GLOB SERPL: 1.6 (CALC) (ref 1–2.5)
ALP SERPL-CCNC: 85 U/L (ref 33–130)
ALT SERPL-CCNC: 41 U/L (ref 6–29)
AST SERPL-CCNC: 34 U/L (ref 10–35)
BASOPHILS # BLD AUTO: 38 CELLS/UL (ref 0–200)
BASOPHILS NFR BLD AUTO: 0.7 %
BILIRUB SERPL-MCNC: 0.6 MG/DL (ref 0.2–1.2)
BUN SERPL-MCNC: 15 MG/DL (ref 7–25)
BUN/CREAT SERPL: ABNORMAL (CALC) (ref 6–22)
CALCIUM SERPL-MCNC: 9.7 MG/DL (ref 8.6–10.4)
CHLORIDE SERPL-SCNC: 104 MMOL/L (ref 98–110)
CHOLEST SERPL-MCNC: 204 MG/DL
CHOLEST/HDLC SERPL: 2.8 (CALC)
CO2 SERPL-SCNC: 28 MMOL/L (ref 20–32)
CREAT SERPL-MCNC: 0.95 MG/DL (ref 0.5–1.05)
EOSINOPHIL # BLD AUTO: 49 CELLS/UL (ref 15–500)
EOSINOPHIL NFR BLD AUTO: 0.9 %
ERYTHROCYTE [DISTWIDTH] IN BLOOD BY AUTOMATED COUNT: 14.4 % (ref 11–15)
GLOBULIN SER CALC-MCNC: 2.7 G/DL (CALC) (ref 1.9–3.7)
GLUCOSE SERPL-MCNC: 91 MG/DL (ref 65–99)
HCT VFR BLD AUTO: 39.5 % (ref 35–45)
HDLC SERPL-MCNC: 74 MG/DL
HGB BLD-MCNC: 12.2 G/DL (ref 11.7–15.5)
LDLC SERPL CALC-MCNC: 114 MG/DL (CALC)
LYMPHOCYTES # BLD AUTO: 1604 CELLS/UL (ref 850–3900)
LYMPHOCYTES NFR BLD AUTO: 29.7 %
MCH RBC QN AUTO: 24.3 PG (ref 27–33)
MCHC RBC AUTO-ENTMCNC: 30.9 G/DL (ref 32–36)
MCV RBC AUTO: 78.7 FL (ref 80–100)
MONOCYTES # BLD AUTO: 502 CELLS/UL (ref 200–950)
MONOCYTES NFR BLD AUTO: 9.3 %
NEUTROPHILS # BLD AUTO: 3208 CELLS/UL (ref 1500–7800)
NEUTROPHILS NFR BLD AUTO: 59.4 %
NONHDLC SERPL-MCNC: 130 MG/DL (CALC)
PLATELET # BLD AUTO: 343 THOUSAND/UL (ref 140–400)
PMV BLD REES-ECKER: 10.2 FL (ref 7.5–12.5)
POTASSIUM SERPL-SCNC: 4.8 MMOL/L (ref 3.5–5.3)
PROT SERPL-MCNC: 7.1 G/DL (ref 6.1–8.1)
RBC # BLD AUTO: 5.02 MILLION/UL (ref 3.8–5.1)
SL AMB EGFR AFRICAN AMERICAN: 80 ML/MIN/1.73M2
SL AMB EGFR NON AFRICAN AMERICAN: 69 ML/MIN/1.73M2
SODIUM SERPL-SCNC: 139 MMOL/L (ref 135–146)
TRIGL SERPL-MCNC: 70 MG/DL
TSH SERPL-ACNC: 1.51 MIU/L
WBC # BLD AUTO: 5.4 THOUSAND/UL (ref 3.8–10.8)

## 2019-12-04 ENCOUNTER — HOSPITAL ENCOUNTER (OUTPATIENT)
Dept: RADIOLOGY | Age: 54
Discharge: HOME | End: 2019-12-04
Payer: COMMERCIAL

## 2019-12-04 VITALS — WEIGHT: 192 LBS | BODY MASS INDEX: 31.99 KG/M2 | HEIGHT: 65 IN

## 2019-12-04 DIAGNOSIS — Z12.31 ENCOUNTER FOR SCREENING MAMMOGRAM FOR MALIGNANT NEOPLASM OF BREAST: ICD-10-CM

## 2019-12-04 PROCEDURE — 77067 SCR MAMMO BI INCL CAD: CPT

## 2020-03-02 NOTE — TELEPHONE ENCOUNTER
"Chief Complaint   Patient presents with     Establish Care       Initial /54   Pulse 68   Temp 97.8  F (36.6  C)   Ht 1.638 m (5' 4.5\")   Wt 48.1 kg (106 lb)   SpO2 98%   BMI 17.91 kg/m   Estimated body mass index is 17.91 kg/m  as calculated from the following:    Height as of this encounter: 1.638 m (5' 4.5\").    Weight as of this encounter: 48.1 kg (106 lb).  Medication Reconciliation: complete  Cris Peralta  " No nasal congest  No sorethr  No gerd

## 2020-11-25 DIAGNOSIS — B00.1 HERPES LABIALIS: Primary | ICD-10-CM

## 2020-11-25 RX ORDER — VALACYCLOVIR HYDROCHLORIDE 1 G/1
1000 TABLET, FILM COATED ORAL 2 TIMES DAILY
Qty: 4 TABLET | Refills: 5 | Status: SHIPPED | OUTPATIENT
Start: 2020-11-25 | End: 2022-01-19

## 2020-11-25 RX ORDER — ACYCLOVIR 50 MG/G
OINTMENT TOPICAL
Qty: 15 G | Refills: 5 | Status: SHIPPED | OUTPATIENT
Start: 2020-11-25

## 2020-12-07 ENCOUNTER — HOSPITAL ENCOUNTER (OUTPATIENT)
Dept: RADIOLOGY | Age: 55
Discharge: HOME | End: 2020-12-07
Payer: COMMERCIAL

## 2020-12-07 VITALS — HEIGHT: 65 IN | BODY MASS INDEX: 31.16 KG/M2 | WEIGHT: 187 LBS

## 2020-12-07 DIAGNOSIS — Z12.31 ENCOUNTER FOR SCREENING MAMMOGRAM FOR MALIGNANT NEOPLASM OF BREAST: ICD-10-CM

## 2020-12-07 PROCEDURE — 77063 BREAST TOMOSYNTHESIS BI: CPT

## 2020-12-07 PROCEDURE — 77067 SCR MAMMO BI INCL CAD: CPT

## 2021-08-05 ENCOUNTER — APPOINTMENT (OUTPATIENT)
Dept: LAB | Facility: CLINIC | Age: 56
End: 2021-08-05
Payer: COMMERCIAL

## 2021-08-05 ENCOUNTER — OFFICE VISIT (OUTPATIENT)
Dept: FAMILY MEDICINE CLINIC | Facility: CLINIC | Age: 56
End: 2021-08-05
Payer: COMMERCIAL

## 2021-08-05 VITALS
OXYGEN SATURATION: 99 % | DIASTOLIC BLOOD PRESSURE: 90 MMHG | TEMPERATURE: 97.6 F | BODY MASS INDEX: 32.15 KG/M2 | SYSTOLIC BLOOD PRESSURE: 122 MMHG | HEIGHT: 65 IN | HEART RATE: 82 BPM | WEIGHT: 193 LBS

## 2021-08-05 DIAGNOSIS — E78.2 MIXED HYPERLIPIDEMIA: ICD-10-CM

## 2021-08-05 DIAGNOSIS — Z00.00 ANNUAL PHYSICAL EXAM: Primary | ICD-10-CM

## 2021-08-05 DIAGNOSIS — M79.671 PAIN OF RIGHT HEEL: ICD-10-CM

## 2021-08-05 DIAGNOSIS — E55.9 VITAMIN D DEFICIENCY: ICD-10-CM

## 2021-08-05 DIAGNOSIS — J01.00 ACUTE NON-RECURRENT MAXILLARY SINUSITIS: ICD-10-CM

## 2021-08-05 DIAGNOSIS — Z00.00 ANNUAL PHYSICAL EXAM: ICD-10-CM

## 2021-08-05 LAB
25(OH)D3 SERPL-MCNC: 21.7 NG/ML (ref 30–100)
ALBUMIN SERPL BCP-MCNC: 3.9 G/DL (ref 3.5–5)
ALP SERPL-CCNC: 188 U/L (ref 46–116)
ALT SERPL W P-5'-P-CCNC: 80 U/L (ref 12–78)
ANION GAP SERPL CALCULATED.3IONS-SCNC: 3 MMOL/L (ref 4–13)
AST SERPL W P-5'-P-CCNC: 68 U/L (ref 5–45)
BILIRUB SERPL-MCNC: 0.39 MG/DL (ref 0.2–1)
BUN SERPL-MCNC: 16 MG/DL (ref 5–25)
CALCIUM SERPL-MCNC: 9.7 MG/DL (ref 8.3–10.1)
CHLORIDE SERPL-SCNC: 107 MMOL/L (ref 100–108)
CHOLEST SERPL-MCNC: 233 MG/DL (ref 50–200)
CO2 SERPL-SCNC: 26 MMOL/L (ref 21–32)
CREAT SERPL-MCNC: 0.91 MG/DL (ref 0.6–1.3)
ERYTHROCYTE [DISTWIDTH] IN BLOOD BY AUTOMATED COUNT: 14.8 % (ref 11.6–15.1)
GFR SERPL CREATININE-BSD FRML MDRD: 72 ML/MIN/1.73SQ M
GLUCOSE P FAST SERPL-MCNC: 85 MG/DL (ref 65–99)
HCT VFR BLD AUTO: 42.2 % (ref 34.8–46.1)
HDLC SERPL-MCNC: 91 MG/DL
HGB BLD-MCNC: 13.3 G/DL (ref 11.5–15.4)
LDLC SERPL CALC-MCNC: 126 MG/DL (ref 0–100)
MCH RBC QN AUTO: 25 PG (ref 26.8–34.3)
MCHC RBC AUTO-ENTMCNC: 31.5 G/DL (ref 31.4–37.4)
MCV RBC AUTO: 79 FL (ref 82–98)
PLATELET # BLD AUTO: 410 THOUSANDS/UL (ref 149–390)
PMV BLD AUTO: 10.2 FL (ref 8.9–12.7)
POTASSIUM SERPL-SCNC: 4.6 MMOL/L (ref 3.5–5.3)
PROT SERPL-MCNC: 8.7 G/DL (ref 6.4–8.2)
RBC # BLD AUTO: 5.33 MILLION/UL (ref 3.81–5.12)
SODIUM SERPL-SCNC: 136 MMOL/L (ref 136–145)
TRIGL SERPL-MCNC: 82 MG/DL
TSH SERPL DL<=0.05 MIU/L-ACNC: 1.41 UIU/ML (ref 0.36–3.74)
WBC # BLD AUTO: 9.21 THOUSAND/UL (ref 4.31–10.16)

## 2021-08-05 PROCEDURE — 85027 COMPLETE CBC AUTOMATED: CPT

## 2021-08-05 PROCEDURE — 99214 OFFICE O/P EST MOD 30 MIN: CPT | Performed by: FAMILY MEDICINE

## 2021-08-05 PROCEDURE — 36415 COLL VENOUS BLD VENIPUNCTURE: CPT

## 2021-08-05 PROCEDURE — 84443 ASSAY THYROID STIM HORMONE: CPT

## 2021-08-05 PROCEDURE — 3725F SCREEN DEPRESSION PERFORMED: CPT | Performed by: FAMILY MEDICINE

## 2021-08-05 PROCEDURE — 3008F BODY MASS INDEX DOCD: CPT | Performed by: FAMILY MEDICINE

## 2021-08-05 PROCEDURE — 80061 LIPID PANEL: CPT

## 2021-08-05 PROCEDURE — 1036F TOBACCO NON-USER: CPT | Performed by: FAMILY MEDICINE

## 2021-08-05 PROCEDURE — 80053 COMPREHEN METABOLIC PANEL: CPT

## 2021-08-05 PROCEDURE — 82306 VITAMIN D 25 HYDROXY: CPT

## 2021-08-05 PROCEDURE — 99396 PREV VISIT EST AGE 40-64: CPT | Performed by: FAMILY MEDICINE

## 2021-08-05 RX ORDER — AMOXICILLIN AND CLAVULANATE POTASSIUM 875; 125 MG/1; MG/1
1 TABLET, FILM COATED ORAL EVERY 12 HOURS SCHEDULED
Qty: 14 TABLET | Refills: 0 | Status: SHIPPED | OUTPATIENT
Start: 2021-08-05 | End: 2021-08-12

## 2021-08-05 NOTE — PATIENT INSTRUCTIONS
Wellness Visit for Adults   AMBULATORY CARE:   A wellness visit  is when you see your healthcare provider to get screened for health problems  Your healthcare provider will also give you advice on how to stay healthy  Write down your questions so you remember to ask them  Ask your healthcare provider how often you should have a wellness visit  What happens at a wellness visit:  Your healthcare provider will ask about your health, and your family history of health problems  This includes high blood pressure, heart disease, and cancer  He or she will ask if you have symptoms that concern you, if you smoke, and about your mood  You may also be asked about your intake of medicines, supplements, food, and alcohol  Any of the following may be done:  · Your weight  will be checked  Your height may also be checked so your body mass index (BMI) can be calculated  Your BMI shows if you are at a healthy weight  · Your blood pressure  and heart rate will be checked  Your temperature may also be checked  · Blood and urine tests  may be done  Blood tests may be done to check your cholesterol levels  Abnormal cholesterol levels increase your risk for heart disease and stroke  You may also need a blood or urine test to check for diabetes if you are at increased risk  Urine tests may be done to look for signs of an infection or kidney disease  · A physical exam  includes checking your heartbeat and lungs with a stethoscope  Your healthcare provider may also check your skin to look for sun damage  · Screening tests  may be recommended  A screening test is done to check for diseases that may not cause symptoms  The screening tests you may need depend on your age, gender, family history, and lifestyle habits  For example, colorectal screening may be recommended if you are 48years old or older  Screening tests you need if you are a woman:   · A Pap smear  is used to screen for cervical cancer   Pap smears are usually done every 3 to 5 years depending on your age  You may need them more often if you have had abnormal Pap smear test results in the past  Ask your healthcare provider how often you should have a Pap smear  · A mammogram  is an x-ray of your breasts to screen for breast cancer  Experts recommend mammograms every 2 years starting at age 48 years  You may need a mammogram at age 52 years or younger if you have an increased risk for breast cancer  Talk to your healthcare provider about when you should start having mammograms and how often you need them  Vaccines you may need:   · Get an influenza vaccine  every year  The influenza vaccine protects you from the flu  Several types of viruses cause the flu  The viruses change over time, so new vaccines are made each year  · Get a tetanus-diphtheria (Td) booster vaccine  every 10 years  This vaccine protects you against tetanus and diphtheria  Tetanus is a severe infection that may cause painful muscle spasms and lockjaw  Diphtheria is a severe bacterial infection that causes a thick covering in the back of your mouth and throat  · Get a human papillomavirus (HPV) vaccine  if you are female and aged 23 to 32 or male 23 to 24 and never received it  This vaccine protects you from HPV infection  HPV is the most common infection spread by sexual contact  HPV may also cause vaginal, penile, and anal cancers  · Get a pneumococcal vaccine  if you are aged 72 years or older  The pneumococcal vaccine is an injection given to protect you from pneumococcal disease  Pneumococcal disease is an infection caused by pneumococcal bacteria  The infection may cause pneumonia, meningitis, or an ear infection  · Get a shingles vaccine  if you are 60 or older, even if you have had shingles before  The shingles vaccine is an injection to protect you from the varicella-zoster virus  This is the same virus that causes chickenpox   Shingles is a painful rash that develops in people who had chickenpox or have been exposed to the virus  How to eat healthy:  My Plate is a model for planning healthy meals  It shows the types and amounts of foods that should go on your plate  Fruits and vegetables make up about half of your plate, and grains and protein make up the other half  A serving of dairy is included on the side of your plate  The amount of calories and serving sizes you need depends on your age, gender, weight, and height  Examples of healthy foods are listed below:  · Eat a variety of vegetables  such as dark green, red, and orange vegetables  You can also include canned vegetables low in sodium (salt) and frozen vegetables without added butter or sauces  · Eat a variety of fresh fruits , canned fruit in 100% juice, frozen fruit, and dried fruit  · Include whole grains  At least half of the grains you eat should be whole grains  Examples include whole-wheat bread, wheat pasta, brown rice, and whole-grain cereals such as oatmeal     · Eat a variety of protein foods such as seafood (fish and shellfish), lean meat, and poultry without skin (turkey and chicken)  Examples of lean meats include pork leg, shoulder, or tenderloin, and beef round, sirloin, tenderloin, and extra lean ground beef  Other protein foods include eggs and egg substitutes, beans, peas, soy products, nuts, and seeds  · Choose low-fat dairy products such as skim or 1% milk or low-fat yogurt, cheese, and cottage cheese  · Limit unhealthy fats  such as butter, hard margarine, and shortening  Exercise:  Exercise at least 30 minutes per day on most days of the week  Some examples of exercise include walking, biking, dancing, and swimming  You can also fit in more physical activity by taking the stairs instead of the elevator or parking farther away from stores  Include muscle strengthening activities 2 days each week  Regular exercise provides many health benefits   It helps you manage your weight, and decreases your risk for type 2 diabetes, heart disease, stroke, and high blood pressure  Exercise can also help improve your mood  Ask your healthcare provider about the best exercise plan for you  General health and safety guidelines:   · Do not smoke  Nicotine and other chemicals in cigarettes and cigars can cause lung damage  Ask your healthcare provider for information if you currently smoke and need help to quit  E-cigarettes or smokeless tobacco still contain nicotine  Talk to your healthcare provider before you use these products  · Limit alcohol  A drink of alcohol is 12 ounces of beer, 5 ounces of wine, or 1½ ounces of liquor  · Lose weight, if needed  Being overweight increases your risk of certain health conditions  These include heart disease, high blood pressure, type 2 diabetes, and certain types of cancer  · Protect your skin  Do not sunbathe or use tanning beds  Use sunscreen with a SPF 15 or higher  Apply sunscreen at least 15 minutes before you go outside  Reapply sunscreen every 2 hours  Wear protective clothing, hats, and sunglasses when you are outside  · Drive safely  Always wear your seatbelt  Make sure everyone in your car wears a seatbelt  A seatbelt can save your life if you are in an accident  Do not use your cell phone when you are driving  This could distract you and cause an accident  Pull over if you need to make a call or send a text message  · Practice safe sex  Use latex condoms if are sexually active and have more than one partner  Your healthcare provider may recommend screening tests for sexually transmitted infections (STIs)  · Wear helmets, lifejackets, and protective gear  Always wear a helmet when you ride a bike or motorcycle, go skiing, or play sports that could cause a head injury  Wear protective equipment when you play sports  Wear a lifejacket when you are on a boat or doing water sports      © Copyright [employer] 2021 Information is for End User's use only and may not be sold, redistributed or otherwise used for commercial purposes  All illustrations and images included in CareNotes® are the copyrighted property of [employer]  or [employer]  The above information is an  only  It is not intended as medical advice for individual conditions or treatments  Talk to your doctor, nurse or pharmacist before following any medical regimen to see if it is safe and effective for you  Cubital Tunnel Syndrome   AMBULATORY CARE:   Cubital tunnel syndrome  is a condition that causes pressure to build on the ulnar nerve in your elbow  The ulnar nerve controls muscles and feeling in the hand  Cubital tunnel syndrome may be caused by direct pressure, stretching, or decreased blood flow to the ulnar nerve  Common signs and symptoms include the following:   · Numbness and tingling in your arm or hand, usually in the ring and little fingers    · Pain in your elbow that extends into your forearm and hand    · Weakness in your hand and fingers    · Not being able to straighten your fingers, usually the ring and little fingers    Seek care immediately if:  · You suddenly lose feeling in your hand or fingers  · You cannot move your ring or little finger  Call your doctor or orthopedist if:   · Your symptoms get worse  · Your hand and fingers are so weak that you cannot grab, squeeze, or lift items  · You have questions or concerns about your condition or care  Treatment  may not be needed  If your symptoms continue, you may need any of the following:  · NSAIDs  help decrease swelling and pain or fever  This medicine is available with or without a doctor's order  NSAIDs can cause stomach bleeding or kidney problems in certain people  If you take blood thinner medicine, always ask your healthcare provider if NSAIDs are safe for you  Always read the medicine label and follow directions      · A steroid injection  helps decrease pain and swelling  · Surgery  may be needed if your symptoms do not get better within 3 months  Surgery will take pressure off your ulnar nerve  Your surgeon may move your nerve to a different area to stop it from being stretched or pinched  He or she may remove part of your bone if it is pressing on your nerve  Manage your symptoms:   · Do not put pressure on your elbow  Certain positions put pressure on the ulnar nerve in your elbow  Do not lean on your elbow  Do not sleep with your arm overhead and elbow bent  Go to sleep with your arm straight and by your side  · Apply ice  Ice helps decrease swelling and pain and prevents tissue damage  Use an ice pack or put crushed ice in a plastic bag  Cover the bag with a towel before you place it on your skin  Apply ice for 15 to 20 minutes every hour, or as directed  · Rest your arm  Avoid activities that cause your symptoms to allow your nerve to heal     · Go to physical therapy as directed  A physical therapist can show you exercises to help improve movement and strength  Physical therapy can also help decrease pain and loss of function  · Use an elbow splint or brace  The brace or splint helps decrease arm movement and keep pressure off your ulnar nerve  Your healthcare provider will tell you when and how long to wear it each day  You may need to wear it at night  You may also need elbow pads to protect your elbow  Follow up with your doctor or orthopedist as directed:  Write down your questions so you remember to ask them during your visits  © Copyright [employer] 2021 Information is for End User's use only and may not be sold, redistributed or otherwise used for commercial purposes  All illustrations and images included in CareNotes® are the copyrighted property of [employer]  or [employer]  The above information is an  only  It is not intended as medical advice for individual conditions or treatments   Talk to your doctor, nurse or pharmacist before following any medical regimen to see if it is safe and effective for you  Plantar Fasciitis Exercises   AMBULATORY CARE:   What you need to know about plantar fasciitis exercises:  Plantar fasciitis exercises help stretch your plantar fascia, calf muscles, and Achilles tendon  They also help strengthen the muscles that support your heel and foot  Exercises and stretching can help prevent plantar fasciitis from getting worse or coming back  Contact your healthcare provider if:   · Your pain and swelling increase  · You develop new knee, hip, or back pain  · You have questions or concerns about your condition or care  How to do plantar fasciitis exercises:  Ask your healthcare provider when to start these exercises and how often to do them  · Slant board stretch:  Stand on a slanted board with your toes higher than your heel  Press your heel into the board  Keep your knee slightly bent  Hold this position for 1 minute  Repeat 5 times  · Heel stretch:  Stand up straight with your hands on a wall  Place your injured leg slightly behind your other leg  Keep your heels flat on the floor, lean forward, and bend both knees  Hold for 30 seconds  · Calf stretch:  Stand up straight with your hands on a wall  Step forward so that your uninjured foot is in front of your injured foot  Keep your front leg bent and your back leg straight  Gently lean forward until you feel your calf stretch  Hold for 30 seconds and then relax  · Seated plantar fascia stretch:  Sit on a firm surface, such as the floor or a mat  Extend your legs out in front of you  Raise your injured foot a few inches off the ground  Keep your leg straight  Grab the toes of your injured foot and pull them toward you  With your other hand, feel your plantar fascia  You should feel it press outward  Hold for 30 seconds  If you cannot reach your toes, loop a towel or tie around your foot   Gently pull on the towel or tie and flex your toes toward you  · Heel raises:  Stand on the injured leg  Raise your other leg off the ground  Hold onto a railing or wall for balance  Slowly rise up on the toes of your injured leg  Hold for 5 seconds  Slowly lower your heel to the ground  · Toe curls:  Place a towel on the floor  Put your foot flat on the towel  Grab the towel with your toes by curling them around the towel  Lift the towel up with your toes  · Toe taps:  Sit down and place your foot flat on the floor  Keep your heel on the floor  Point all your toes up toward the ceiling  While the 4 smaller toes are pointed up, bend your big toe down and tap it on the ground  Do 10 to 50 taps  Point all 5 toes up toward the ceiling again  This time keep your big toe pointed up and tap the 4 smaller toes on the ground  Do 10 to 50 taps each time  Follow up with your healthcare provider as directed:  Write down your questions so you remember to ask them during your visits  © Copyright [employer] 2021 Information is for End User's use only and may not be sold, redistributed or otherwise used for commercial purposes  All illustrations and images included in CareNotes® are the copyrighted property of [employer]  or [employer]  The above information is an  only  It is not intended as medical advice for individual conditions or treatments  Talk to your doctor, nurse or pharmacist before following any medical regimen to see if it is safe and effective for you

## 2021-08-05 NOTE — PROGRESS NOTES
ADULT ANNUAL [address]    NAME: Blanca Guerrier  AGE: 50 y o  SEX: female  : 1965   DATE: 2021     Assessment and Plan:   Immunizations and preventive care screenings were discussed with patient today  Appropriate education was printed on patient's after visit summary  Counseling:  Dental Health: discussed importance of regular tooth brushing, flossing, and dental visits  · Exercise: the importance of regular exercise/physical activity was discussed  Recommend exercise 3-5 times per week for at least 30 minutes  BMI Counseling: Body mass index is 32 12 kg/m²  The BMI is above normal  Nutrition recommendations include decreasing portion sizes, encouraging healthy choices of fruits and vegetables and limiting drinks that contain sugar  Exercise recommendations include moderate physical activity 150 minutes/week, exercising 3-5 times per week and strength training exercises  Patient referred to PCP due to patient being overweight  Return in about 1 year (around 2022) for Annual physical      Chief Complaint:     Chief Complaint   Patient presents with    Annual Exam      History of Present Illness:     Adult Annual Physical   Patient here for a comprehensive physical exam  The patient reports problems - see other note  Diet and Physical Activity  · Diet/Nutrition: well balanced diet  · Exercise: walking        Depression Screening  PHQ-9 Depression Screening    PHQ-9:   Frequency of the following problems over the past two weeks:      Little interest or pleasure in doing things: 0 - not at all  Feeling down, depressed, or hopeless: 0 - not at all  Trouble falling or staying asleep, or sleeping too much: 0 - not at all  Feeling tired or having little energy: 0 - not at all  Poor appetite or overeatin - not at all  Feeling bad about yourself - or that you are a failure or have let yourself or your family down: 0 - not at all  Trouble concentrating on things, such as reading the newspaper or watching television: 0 - not at all  Moving or speaking so slowly that other people could have noticed  Or the opposite - being so fidgety or restless that you have been moving around a lot more than usual: 0 - not at all  Thoughts that you would be better off dead, or of hurting yourself in some way: 0 - not at all  PHQ-2 Score: 0  PHQ-9 Score: 0       General Health  · Sleep: sleeps well  · Hearing: normal - bilateral   · Vision: no vision problems  · Dental: regular dental visits  /GYN Health  · Patient is: perimenopausal  · Last menstrual period: Patient's last menstrual period was 2021 (approximate)  Review of Systems:     Review of Systems   Constitutional: Negative for activity change, chills and fever  HENT: Positive for sinus pressure  Negative for congestion, rhinorrhea and sore throat  Eyes: Negative for visual disturbance  Respiratory: Negative for cough, shortness of breath and wheezing  Cardiovascular: Negative for chest pain and palpitations  Gastrointestinal: Negative for abdominal pain, blood in stool, constipation, diarrhea, nausea and vomiting  Genitourinary: Negative for dysuria  Musculoskeletal: Positive for arthralgias  Negative for myalgias  Skin: Negative for rash  Neurological: Negative for dizziness, weakness and headaches  All other systems reviewed and are negative  Past Medical History:     Past Medical History:   Diagnosis Date    Cervical cancer [facility])     age 27    Lipoma     last assessed  Elisabeth Dow 16   resolved    19        Past Surgical History:     Past Surgical History:   Procedure Laterality Date    CERVICAL CONIZATION   W/ LASER      by cold knife     SECTION      , ,       SYNOVECTOMY      radical synovectomy of tendon sheath of palm or finger   trigger thumb release in      THUMB FUSION      TUBAL LIGATION Bilateral       Social History:     Social History     Socioeconomic History    Marital status: /Civil Union     Spouse name: None    Number of children: None    Years of education: None    Highest education level: None   Occupational History     Comment: full time employment   Tobacco Use    Smoking status: Never Smoker    Smokeless tobacco: Never Used   Substance and Sexual Activity    Alcohol use: Yes     Comment: social     Drug use: No    Sexual activity: Yes   Other Topics Concern    None   Social History Narrative    Almost always uses seat belts    Daily coffee consumption     Daily tea consumption     Dental care regularly    Exercise walking    Exercise yoga    Pet animals dog    Some college    Tea    Water intake adequate per day     Social Determinants of Health     Financial Resource Strain:     Difficulty of Paying Living Expenses:    Food Insecurity:     Worried About Running Out of Food in the Last Year:     Ran Out of Food in the Last Year:    Transportation Needs:     Lack of Transportation (Medical):      Lack of Transportation (Non-Medical):    Physical Activity:     Days of Exercise per Week:     Minutes of Exercise per Session:    Stress:     Feeling of Stress :    Social Connections:     Frequency of Communication with Friends and Family:     Frequency of Social Gatherings with Friends and Family:     Attends Restorationist Services:     Active Member of Clubs or Organizations:     Attends Club or Organization Meetings:     Marital Status:    Intimate Partner Violence:     Fear of Current or Ex-Partner:     Emotionally Abused:     Physically Abused:     Sexually Abused:       Family History:     Family History   Problem Relation Age of Onset    Breast cancer Mother 59    Diabetes Mother     Ovarian cancer Mother 39    Diabetes Father     Hypertension Family     Breast cancer Cousin 52    No Known Problems Daughter     No Known Problems Daughter     No Known Problems Maternal Aunt     No Known Problems Maternal Aunt     No Known Problems Maternal Grandmother     No Known Problems Maternal Grandfather     No Known Problems Paternal Grandmother     No Known Problems Paternal Grandfather       Current Medications:     Current Outpatient Medications   Medication Sig Dispense Refill    acyclovir (ZOVIRAX) 5 % ointment Apply topically every 3 (three) hours (6x a day) 15 g 5    cetirizine (ZyrTEC) 10 mg tablet Take 10 mg by mouth daily      ibuprofen (MOTRIN) 600 mg tablet Take by mouth      Multiple Vitamin (MULTIVITAMINS PO) Take by mouth      valACYclovir (VALTREX) 1,000 mg tablet Take 1 tablet (1,000 mg total) by mouth 2 (two) times a day for 2 doses 4 tablet 5    amoxicillin-clavulanate (AUGMENTIN) 875-125 mg per tablet Take 1 tablet by mouth every 12 (twelve) hours for 7 days 14 tablet 0     No current facility-administered medications for this visit  Allergies: Allergies   Allergen Reactions    Codeine Anaphylaxis      Physical Exam:     /90   Pulse 82   Temp 97 6 °F (36 4 °C)   Ht 5' 5" (1 651 m)   Wt 87 5 kg (193 lb)   LMP 03/05/2021 (Approximate)   SpO2 99%   BMI 32 12 kg/m²     Physical Exam  Vitals and nursing note reviewed  Constitutional:       General: She is not in acute distress  Appearance: She is well-developed  She is not ill-appearing  HENT:      Head: Normocephalic and atraumatic  Right Ear: Tympanic membrane, ear canal and external ear normal  No middle ear effusion  Left Ear: Tympanic membrane, ear canal and external ear normal   No middle ear effusion  Nose: Nose normal  No congestion or rhinorrhea  Mouth/Throat:      Lips: Pink  Mouth: Mucous membranes are moist       Pharynx: Oropharynx is clear  Uvula midline  No oropharyngeal exudate  Tonsils: No tonsillar exudate  Eyes:      General: Lids are normal       Extraocular Movements: Extraocular movements intact  Conjunctiva/sclera: Conjunctivae normal       Pupils: Pupils are equal, round, and reactive to light  Neck:      Thyroid: No thyromegaly  Trachea: No tracheal deviation  Cardiovascular:      Rate and Rhythm: Normal rate and regular rhythm  Pulses: Normal pulses  Heart sounds: Normal heart sounds, S1 normal and S2 normal  No murmur heard  Pulmonary:      Effort: Pulmonary effort is normal  No respiratory distress  Breath sounds: Normal breath sounds  No decreased breath sounds, wheezing, rhonchi or rales  Abdominal:      General: Bowel sounds are normal  There is no distension  Palpations: Abdomen is soft  Tenderness: There is no abdominal tenderness  Musculoskeletal:      Right lower leg: No edema  Left lower leg: No edema  Lymphadenopathy:      Cervical: No cervical adenopathy  Skin:     General: Skin is warm and dry  Capillary Refill: Capillary refill takes less than 2 seconds  Neurological:      Mental Status: She is alert and oriented to person, place, and time  Cranial Nerves: Cranial nerves are intact  Deep Tendon Reflexes: Reflexes normal       Reflex Scores:       Patellar reflexes are 2+ on the right side and 2+ on the left side  Psychiatric:         Attention and Perception: Attention normal          Mood and Affect: Mood normal          Thought Content: Thought content does not include suicidal ideation  BMI Counseling: Body mass index is 32 12 kg/m²  The BMI is above normal  Nutrition recommendations include 3-5 servings of fruits/vegetables daily      Chery Hector MD  [facility]

## 2021-08-06 NOTE — PROGRESS NOTES
Nik Watkins 1965 female MRN: [id number]    Family Medicine Follow-up Visit    Assessment/Plan   Heel pain - likely plantar fasciitis, however due to calcaneus squeeze pain, obtain XR to rule out stress fracture  Handout for exercises provided  Recommend supportive shoes  Elbow pain - Golfer's elbow  Recommend PT  Numbness - likely positional  If more consistent, prominent, or bothersome follow up with pcp  Sinusitis- start Augmentin  Counseled the patient regarding supportive care  They are to call or return to the office if not improving  Bessy Alejandro was seen today for annual exam     Diagnoses and all orders for this visit:    Annual physical exam  -     Lipid Panel with Direct LDL reflex; Future  -     CBC; Future  -     Comprehensive metabolic panel; Future  -     TSH, 3rd generation with Free T4 reflex; Future  -     Vitamin D 25 hydroxy; Future    Acute non-recurrent maxillary sinusitis  -     amoxicillin-clavulanate (AUGMENTIN) 875-125 mg per tablet; Take 1 tablet by mouth every 12 (twelve) hours for 7 days    Mixed hyperlipidemia  -     Lipid Panel with Direct LDL reflex; Future  -     Comprehensive metabolic panel; Future  -     TSH, 3rd generation with Free T4 reflex; Future    Vitamin D deficiency  -     Vitamin D 25 hydroxy; Future    Pain of right heel  -     XR heel / calcaneus 2+ vw right; Future        Mehran Nation MD  [address]  8/5/2021      Please be aware that this note contains text that was dictated and there may be errors pertaining to "sound-alike" words during the dictation process  SUBJECTIVE    CC: several concerns    HPI:  Nik Watkins is a 50 y o  female who presented for a follow-up of several concerns:    She wishes to discuss the Covid-19 vaccine  She got the first shot, but didn't get the second  Sinus pressure, ear pressure, headache  No fever, cough, myalgias       Right elbow pain - she's had tennis elbow before, but this is in a different location  Denies weakness, tingling  Pain with certain movements  Present for about 2 weeks  Notices when she's brushing her hair  She is right hand dominant  Sometimes her hands go numb when she's asleep, especially when she's laying on her right side  She is owrking more at a desk  She doesn't go numb during the day  R>L  She is right-hand dominant  Does not wake her from sleep  She would like annual labs ordered  Review of Systems    Historical Information     The following portions of the patient's history were reviewed and updated as appropriate: allergies, current medications, past medical history, past social history and problem list     Medications:   Meds/Allergies     Current Outpatient Medications:     acyclovir (ZOVIRAX) 5 % ointment, Apply topically every 3 (three) hours (6x a day), Disp: 15 g, Rfl: 5    cetirizine (ZyrTEC) 10 mg tablet, Take 10 mg by mouth daily, Disp: , Rfl:     ibuprofen (MOTRIN) 600 mg tablet, Take by mouth, Disp: , Rfl:     Multiple Vitamin (MULTIVITAMINS PO), Take by mouth, Disp: , Rfl:     valACYclovir (VALTREX) 1,000 mg tablet, Take 1 tablet (1,000 mg total) by mouth 2 (two) times a day for 2 doses, Disp: 4 tablet, Rfl: 5    amoxicillin-clavulanate (AUGMENTIN) 875-125 mg per tablet, Take 1 tablet by mouth every 12 (twelve) hours for 7 days, Disp: 14 tablet, Rfl: 0  Allergies   Allergen Reactions    Codeine Anaphylaxis       OBJECTIVE    Vitals:   Vitals:    08/05/21 0756   BP: 122/90   Pulse: 82   Temp: 97 6 °F (36 4 °C)   SpO2: 99%   Weight: 87 5 kg (193 lb)   Height: 5' 5" (1 651 m)     Wt Readings from Last 3 Encounters:   08/05/21 87 5 kg (193 lb)   12/07/20 84 8 kg (187 lb)   12/04/19 87 1 kg (192 lb)     Body mass index is 32 12 kg/m²      BP Readings from Last 3 Encounters:   08/05/21 122/90   07/03/19 120/82   02/27/19 122/80     Pulse Readings from Last 3 Encounters:   08/05/21 82   07/03/19 74   02/27/19 78     Patient's last menstrual period was 03/05/2021 (approximate)  Physical Exam:    Physical Exam  Vitals and nursing note reviewed  Constitutional:       General: She is not in acute distress  Appearance: She is well-developed  She is not ill-appearing  HENT:      Head: Normocephalic and atraumatic  Right Ear: Ear canal and external ear normal  A middle ear effusion is present  Left Ear: Tympanic membrane, ear canal and external ear normal       Nose: Nose normal       Mouth/Throat:      Pharynx: Uvula midline  No oropharyngeal exudate  Tonsils: No tonsillar exudate  Eyes:      Conjunctiva/sclera: Conjunctivae normal    Neck:      Thyroid: No thyromegaly  Cardiovascular:      Rate and Rhythm: Normal rate and regular rhythm  Heart sounds: Normal heart sounds  No murmur heard  Pulmonary:      Effort: Pulmonary effort is normal  No respiratory distress  Breath sounds: Normal breath sounds  No decreased breath sounds, wheezing, rhonchi or rales  Abdominal:      General: Bowel sounds are normal  There is no distension  Palpations: Abdomen is soft  Tenderness: There is no abdominal tenderness  Musculoskeletal:      Right foot: Tenderness present  Comments: Right heel: Patient has tenderness to palpation on the dorsum of her heel, +calcaneal squeeze  Full ROM without pain    Right elbow: tender to palpation over medial aspect of upper forearm  Intact strength, sensation  +tinel tap elbow  Negative tinel tap wrist     Lymphadenopathy:      Cervical: No cervical adenopathy  Skin:     General: Skin is warm and dry  Capillary Refill: Capillary refill takes less than 2 seconds  Neurological:      Mental Status: She is alert and oriented to person, place, and time  Sensory: No sensory deficit  Motor: No abnormal muscle tone  Deep Tendon Reflexes: Reflexes normal         Labs: I have personally reviewed all pertinent results       Imaging:  I have personally reviewed all pertinent results

## 2021-08-11 ENCOUNTER — HOSPITAL ENCOUNTER (OUTPATIENT)
Dept: RADIOLOGY | Age: 56
Discharge: HOME | End: 2021-08-11
Payer: COMMERCIAL

## 2021-08-11 ENCOUNTER — APPOINTMENT (OUTPATIENT)
Dept: RADIOLOGY | Age: 56
End: 2021-08-11
Payer: COMMERCIAL

## 2021-08-11 DIAGNOSIS — M79.671 PAIN OF RIGHT HEEL: ICD-10-CM

## 2021-08-11 DIAGNOSIS — R74.8 ELEVATED ALKALINE PHOSPHATASE LEVEL: ICD-10-CM

## 2021-08-11 DIAGNOSIS — R74.01 ELEVATED TRANSAMINASE LEVEL: ICD-10-CM

## 2021-08-11 PROCEDURE — 73650 X-RAY EXAM OF HEEL: CPT

## 2021-08-11 PROCEDURE — 76705 ECHO EXAM OF ABDOMEN: CPT

## 2021-10-05 ENCOUNTER — PATIENT MESSAGE (OUTPATIENT)
Dept: FAMILY MEDICINE CLINIC | Facility: CLINIC | Age: 56
End: 2021-10-05

## 2021-12-01 ENCOUNTER — OFFICE VISIT (OUTPATIENT)
Dept: FAMILY MEDICINE CLINIC | Facility: CLINIC | Age: 56
End: 2021-12-01
Payer: COMMERCIAL

## 2021-12-01 VITALS
WEIGHT: 200.4 LBS | HEIGHT: 65 IN | DIASTOLIC BLOOD PRESSURE: 82 MMHG | TEMPERATURE: 98.3 F | HEART RATE: 92 BPM | BODY MASS INDEX: 33.39 KG/M2 | SYSTOLIC BLOOD PRESSURE: 120 MMHG | OXYGEN SATURATION: 93 %

## 2021-12-01 DIAGNOSIS — M54.6 CHRONIC BILATERAL THORACIC BACK PAIN: ICD-10-CM

## 2021-12-01 DIAGNOSIS — N62 GYNECOMASTIA: ICD-10-CM

## 2021-12-01 DIAGNOSIS — G89.29 CHRONIC BILATERAL THORACIC BACK PAIN: ICD-10-CM

## 2021-12-01 DIAGNOSIS — L30.9 ECZEMA, UNSPECIFIED TYPE: Primary | ICD-10-CM

## 2021-12-01 PROBLEM — F33.0 MILD EPISODE OF RECURRENT MAJOR DEPRESSIVE DISORDER (HCC): Status: RESOLVED | Noted: 2019-02-27 | Resolved: 2021-12-01

## 2021-12-01 PROCEDURE — 3008F BODY MASS INDEX DOCD: CPT | Performed by: FAMILY MEDICINE

## 2021-12-01 PROCEDURE — 99213 OFFICE O/P EST LOW 20 MIN: CPT | Performed by: FAMILY MEDICINE

## 2021-12-01 PROCEDURE — 1036F TOBACCO NON-USER: CPT | Performed by: FAMILY MEDICINE

## 2021-12-01 RX ORDER — BETAMETHASONE DIPROPIONATE 0.5 MG/G
CREAM TOPICAL 2 TIMES DAILY
Qty: 30 G | Refills: 0 | Status: SHIPPED | OUTPATIENT
Start: 2021-12-01

## 2021-12-06 ENCOUNTER — TELEPHONE (OUTPATIENT)
Dept: FAMILY MEDICINE CLINIC | Facility: CLINIC | Age: 56
End: 2021-12-06

## 2021-12-06 PROCEDURE — U0003 INFECTIOUS AGENT DETECTION BY NUCLEIC ACID (DNA OR RNA); SEVERE ACUTE RESPIRATORY SYNDROME CORONAVIRUS 2 (SARS-COV-2) (CORONAVIRUS DISEASE [COVID-19]), AMPLIFIED PROBE TECHNIQUE, MAKING USE OF HIGH THROUGHPUT TECHNOLOGIES AS DESCRIBED BY CMS-2020-01-R: HCPCS | Performed by: FAMILY MEDICINE

## 2021-12-06 PROCEDURE — U0005 INFEC AGEN DETEC AMPLI PROBE: HCPCS | Performed by: FAMILY MEDICINE

## 2021-12-09 ENCOUNTER — HOSPITAL ENCOUNTER (OUTPATIENT)
Dept: RADIOLOGY | Age: 56
Discharge: HOME | End: 2021-12-09
Payer: COMMERCIAL

## 2021-12-09 VITALS — WEIGHT: 194 LBS | BODY MASS INDEX: 32.32 KG/M2 | HEIGHT: 65 IN

## 2021-12-09 DIAGNOSIS — Z12.31 ENCOUNTER FOR SCREENING MAMMOGRAM FOR MALIGNANT NEOPLASM OF BREAST: ICD-10-CM

## 2021-12-09 PROCEDURE — 77063 BREAST TOMOSYNTHESIS BI: CPT

## 2021-12-09 PROCEDURE — 77067 SCR MAMMO BI INCL CAD: CPT

## 2022-01-04 ENCOUNTER — HOSPITAL ENCOUNTER (OUTPATIENT)
Dept: MAMMOGRAPHY | Facility: CLINIC | Age: 57
Discharge: HOME | End: 2022-01-04
Payer: COMMERCIAL

## 2022-01-04 ENCOUNTER — HOSPITAL ENCOUNTER (OUTPATIENT)
Dept: ULTRASOUND IMAGING | Facility: CLINIC | Age: 57
Discharge: HOME | End: 2022-01-04
Payer: COMMERCIAL

## 2022-01-04 DIAGNOSIS — R92.8 ABNORMAL SCREENING MAMMOGRAM: ICD-10-CM

## 2022-01-04 PROCEDURE — 76642 ULTRASOUND BREAST LIMITED: CPT

## 2022-01-04 PROCEDURE — 77065 DX MAMMO INCL CAD UNI: CPT

## 2022-01-04 PROCEDURE — G0279 TOMOSYNTHESIS, MAMMO: HCPCS

## 2022-01-18 ENCOUNTER — TELEPHONE (OUTPATIENT)
Dept: OTHER | Facility: OTHER | Age: 57
End: 2022-01-18

## 2022-01-18 NOTE — TELEPHONE ENCOUNTER
STEPAN Gifford PHONE: [phone] Dr Skyler Stinson pt has appt  tomorrow @ 8:30 AM and was informed she needs a referral please contact patient

## 2022-01-19 ENCOUNTER — OFFICE VISIT (OUTPATIENT)
Dept: FAMILY MEDICINE CLINIC | Facility: CLINIC | Age: 57
End: 2022-01-19
Payer: COMMERCIAL

## 2022-01-19 VITALS
HEIGHT: 65 IN | BODY MASS INDEX: 32.92 KG/M2 | OXYGEN SATURATION: 98 % | TEMPERATURE: 98.5 F | SYSTOLIC BLOOD PRESSURE: 124 MMHG | WEIGHT: 197.6 LBS | HEART RATE: 81 BPM | DIASTOLIC BLOOD PRESSURE: 80 MMHG

## 2022-01-19 DIAGNOSIS — R10.10 PAIN OF UPPER ABDOMEN: Primary | ICD-10-CM

## 2022-01-19 DIAGNOSIS — M54.6 ACUTE MIDLINE THORACIC BACK PAIN: ICD-10-CM

## 2022-01-19 PROCEDURE — 3008F BODY MASS INDEX DOCD: CPT | Performed by: NURSE PRACTITIONER

## 2022-01-19 PROCEDURE — 99214 OFFICE O/P EST MOD 30 MIN: CPT | Performed by: NURSE PRACTITIONER

## 2022-01-19 PROCEDURE — 1036F TOBACCO NON-USER: CPT | Performed by: NURSE PRACTITIONER

## 2022-01-19 RX ORDER — DIPHENOXYLATE HYDROCHLORIDE AND ATROPINE SULFATE 2.5; .025 MG/1; MG/1
1 TABLET ORAL DAILY
COMMUNITY

## 2022-01-19 NOTE — PROGRESS NOTES
Assessment/Plan:     Diagnoses and all orders for this visit:    Pain of upper abdomen  -     US right upper quadrant; Future    Acute midline thoracic back pain    Other orders  -     multivitamin (THERAGRAN) TABS; Take 1 tablet by mouth daily        Discussed with patient plan to obtain an ultrasound of the right upper quadarent  Patient instructed to call if no improvement in 72 hours or symptoms worsen    Subjective:      Patient ID: Montana Swanson is a 49 y o  female  49 y  o female presenting with abdominal pain, limited appetite and a sense of becoming full quickly  She has been noticing no foods or timing of meals to impact the feelings/pain  She also reports new pains between her shoulder blades that is different than her typical lumbar back pains  She denies epigastric pains or acid reflex          Family History   Problem Relation Age of Onset    Breast cancer Mother 59    Diabetes Mother     Ovarian cancer Mother 39    Diabetes Father     Hypertension Family     Breast cancer Cousin 52    No Known Problems Daughter     No Known Problems Daughter     No Known Problems Maternal Aunt     No Known Problems Maternal Aunt     No Known Problems Maternal Grandmother     No Known Problems Maternal Grandfather     No Known Problems Paternal Grandmother     No Known Problems Paternal Grandfather      Social History     Socioeconomic History    Marital status: /Civil Union     Spouse name: Not on file    Number of children: Not on file    Years of education: Not on file    Highest education level: Not on file   Occupational History     Comment: full time employment   Tobacco Use    Smoking status: Never Smoker    Smokeless tobacco: Never Used   Substance and Sexual Activity    Alcohol use: Yes     Comment: social     Drug use: No    Sexual activity: Yes   Other Topics Concern    Not on file   Social History Narrative    Almost always uses seat belts    Daily coffee consumption Daily tea consumption     Dental care regularly    Exercise walking    Exercise yoga    Pet animals dog    Some college    Tea    Water intake adequate per day     Social Determinants of Health     Financial Resource Strain: Not on file   Food Insecurity: Not on file   Transportation Needs: Not on file   Physical Activity: Not on file   Stress: Not on file   Social Connections: Not on file   Intimate Partner Violence: Not on file   Housing Stability: Not on file     E-Cigarette/Vaping     E-Cigarette/Vaping Substances     Past Medical History:   Diagnosis Date    Cervical cancer [facility])     age 27    Lipoma     last assessed  Sally Skipper Sally Skipper Sally Skipper 16   resolved    19       Past Surgical History:   Procedure Laterality Date    CERVICAL CONIZATION   W/ LASER      by cold knife     SECTION      , ,       SYNOVECTOMY      radical synovectomy of tendon sheath of palm or finger   trigger thumb release in      THUMB FUSION      TUBAL LIGATION Bilateral      Allergies   Allergen Reactions    Codeine Anaphylaxis       Current Outpatient Medications:     acyclovir (ZOVIRAX) 5 % ointment, Apply topically every 3 (three) hours (6x a day), Disp: 15 g, Rfl: 5    betamethasone dipropionate (DIPROSONE) 0 05 % cream, Apply topically 2 (two) times a day, Disp: 30 g, Rfl: 0    ibuprofen (MOTRIN) 600 mg tablet, Take by mouth, Disp: , Rfl:     multivitamin (THERAGRAN) TABS, Take 1 tablet by mouth daily, Disp: , Rfl:     Review of Systems   Constitutional: Negative  Respiratory: Negative  Cardiovascular: Negative  Gastrointestinal: Positive for abdominal pain  Negative for abdominal distention, constipation, diarrhea, nausea and vomiting  Musculoskeletal: Positive for myalgias  Neurological: Negative  Psychiatric/Behavioral: Negative          Objective:    /80   Pulse 81   Temp 98 5 °F (36 9 °C)   Ht 5' 5" (1 651 m)   Wt 89 6 kg (197 lb 9 6 oz)   SpO2 98%   BMI 32 88 kg/m² (Reviewed)     Physical Exam  Vitals reviewed  Constitutional:       General: She is not in acute distress  Appearance: She is well-developed and well-groomed  She is not ill-appearing  HENT:      Head: Normocephalic and atraumatic  Eyes:      General: Lids are normal       Extraocular Movements: Extraocular movements intact  Conjunctiva/sclera: Conjunctivae normal       Pupils: Pupils are equal, round, and reactive to light  Neck:      Trachea: Trachea and phonation normal    Cardiovascular:      Rate and Rhythm: Normal rate and regular rhythm  Pulses: Normal pulses  Heart sounds: Normal heart sounds  Pulmonary:      Effort: Pulmonary effort is normal       Breath sounds: Normal breath sounds  Abdominal:      General: Abdomen is flat  Bowel sounds are normal  There is no distension  Palpations: Abdomen is soft  There is no mass  Tenderness: There is generalized abdominal tenderness  Negative signs include Badillo's sign  Skin:     General: Skin is warm and dry  Capillary Refill: Capillary refill takes less than 2 seconds  Neurological:      General: No focal deficit present  Mental Status: She is alert and oriented to person, place, and time  Psychiatric:         Mood and Affect: Mood normal          Behavior: Behavior normal  Behavior is cooperative  Thought Content:  Thought content normal

## 2022-01-19 NOTE — TELEPHONE ENCOUNTER
Spoke to Candido at the [place] office , the office only needed the patient ins info not an insurance referral

## 2022-01-26 ENCOUNTER — HOSPITAL ENCOUNTER (OUTPATIENT)
Dept: RADIOLOGY | Age: 57
Discharge: HOME | End: 2022-01-26

## 2022-01-26 DIAGNOSIS — R10.10 PAIN OF UPPER ABDOMEN: ICD-10-CM

## 2022-01-27 ENCOUNTER — HOSPITAL ENCOUNTER (OUTPATIENT)
Dept: RADIOLOGY | Age: 57
Discharge: HOME | End: 2022-01-27
Payer: COMMERCIAL

## 2022-01-27 PROCEDURE — 76705 ECHO EXAM OF ABDOMEN: CPT

## 2022-07-15 ENCOUNTER — TELEPHONE (OUTPATIENT)
Dept: FAMILY MEDICINE CLINIC | Facility: CLINIC | Age: 57
End: 2022-07-15

## 2022-07-15 ENCOUNTER — OFFICE VISIT (OUTPATIENT)
Dept: FAMILY MEDICINE CLINIC | Facility: CLINIC | Age: 57
End: 2022-07-15
Payer: COMMERCIAL

## 2022-07-15 VITALS
TEMPERATURE: 97.7 F | WEIGHT: 182.5 LBS | BODY MASS INDEX: 30.41 KG/M2 | OXYGEN SATURATION: 99 % | DIASTOLIC BLOOD PRESSURE: 82 MMHG | HEART RATE: 81 BPM | HEIGHT: 65 IN | SYSTOLIC BLOOD PRESSURE: 122 MMHG

## 2022-07-15 DIAGNOSIS — R30.0 DYSURIA: Primary | ICD-10-CM

## 2022-07-15 DIAGNOSIS — R20.0 NUMBNESS: ICD-10-CM

## 2022-07-15 PROCEDURE — 99213 OFFICE O/P EST LOW 20 MIN: CPT | Performed by: FAMILY MEDICINE

## 2022-07-15 PROCEDURE — 87186 SC STD MICRODIL/AGAR DIL: CPT | Performed by: FAMILY MEDICINE

## 2022-07-15 PROCEDURE — 87077 CULTURE AEROBIC IDENTIFY: CPT | Performed by: FAMILY MEDICINE

## 2022-07-15 PROCEDURE — 87086 URINE CULTURE/COLONY COUNT: CPT | Performed by: FAMILY MEDICINE

## 2022-07-15 RX ORDER — SULFAMETHOXAZOLE AND TRIMETHOPRIM 800; 160 MG/1; MG/1
1 TABLET ORAL 2 TIMES DAILY
Qty: 6 TABLET | Refills: 0 | Status: SHIPPED | OUTPATIENT
Start: 2022-07-15 | End: 2022-07-18

## 2022-07-15 NOTE — PROGRESS NOTES
Ena Dave 1965 female MRN: [id number]    Acute Visit    Assessment/Plan   UTI - Bactrim sent to pharmacy  Sent out culture  AZO recommended    Numbness/tingling in bilateral extremities   Patient is seeing chiropractor, he suspects thoracic outlet syndrome  Tinel tap, Finkelstein's and Phalen's tests negative  Obtain XR  If further failure of conservative management, would recommend MRI to further characterize     Pat Levels was seen today for painful urination and hand burn  Diagnoses and all orders for this visit:    Dysuria  -     Urine culture; Future  -     Urine culture    Numbness  -     XR spine cervical complete 4 or 5 vw non injury; Future      George Figueroa MD  [address]  7/15/2022      Please be aware that this note contains text that was dictated and there may be errors pertaining to "sound-alike "words during the dictation process  SUBJECTIVE    CC: Painful Urination and Hand Burn (Numbness/)      HPI:  Ena Dave is a 49 y o  female who presented for an acute visit to discuss UTI  Started a few days ago  Urgency and frequency with reduced urine volume and suprapubic pain  Denies fever, nausea  Small pink on the toilet paper here  Separately, patient has had > 6 months of hand numbness/tingling  Started in the left hand and is now bilateral  Present in 1-3rd digits sometimes 4th digits too  Will wake her from sleep  Sometimes happens randomly during the day  She notes shaking her hands alleviates the sensation as well as sitting up straight and moving her head in a particular position  She's been seeing her chiropractor for this now  Dr Kathleen Cardenas in [place]  Reported positive test with elevated arm stress test by her chiropractor  Review of Systems   Constitutional: Negative for fever  HENT: Negative for congestion  Respiratory: Negative for cough      Genitourinary: Positive for decreased urine volume, dysuria, frequency and urgency  Negative for flank pain  Neurological: Positive for numbness  Negative for dizziness, weakness and headaches  All other systems reviewed and are negative  Medications:   Meds/Allergies   Current Outpatient Medications   Medication Sig Dispense Refill    acyclovir (ZOVIRAX) 5 % ointment Apply topically every 3 (three) hours (6x a day) 15 g 5    betamethasone dipropionate (DIPROSONE) 0 05 % cream Apply topically 2 (two) times a day 30 g 0    ibuprofen (MOTRIN) 600 mg tablet Take by mouth      multivitamin (THERAGRAN) TABS Take 1 tablet by mouth daily      sulfamethoxazole-trimethoprim (BACTRIM DS) 800-160 mg per tablet Take 1 tablet by mouth 2 (two) times a day for 3 days 6 tablet 0     No current facility-administered medications for this visit  Allergies   Allergen Reactions    Codeine Anaphylaxis       OBJECTIVE    Vitals:   Vitals:    07/15/22 1500   BP: 122/82   Pulse: 81   Temp: 97 7 °F (36 5 °C)   SpO2: 99%   Weight: 82 8 kg (182 lb 8 oz)   Height: 5' 5" (1 651 m)       Physical Exam  Vitals and nursing note reviewed  Constitutional:       General: She is not in acute distress  Appearance: Normal appearance  She is well-developed  She is not ill-appearing or diaphoretic  HENT:      Head: Normocephalic and atraumatic  Right Ear: External ear normal       Left Ear: External ear normal       Nose: Nose normal    Eyes:      General: Lids are normal       Conjunctiva/sclera: Conjunctivae normal    Neck:      Vascular: No JVD  Trachea: No tracheal deviation  Pulmonary:      Effort: No accessory muscle usage or respiratory distress  Musculoskeletal:      Comments: Negative: Tinel tap at wrist/elbow, Phalen    Skin:     Findings: No rash  Neurological:      Mental Status: She is alert

## 2022-07-15 NOTE — TELEPHONE ENCOUNTER
Patient called reporting possible UTI  Symptoms: major pressure with urination, frequent urination every 20 minutes, denies any back pain  She is also experiencing numbness and tingling in both of her hands, and neck pain  She is asking if she needs an appointment or if medication can just be sent to the pharmacy for possible UTI  She is concerned about the numbness though

## 2022-07-17 LAB — BACTERIA UR CULT: ABNORMAL

## 2022-08-08 ENCOUNTER — RA CDI HCC (OUTPATIENT)
Dept: OTHER | Facility: HOSPITAL | Age: 57
End: 2022-08-08

## 2022-08-08 NOTE — PROGRESS NOTES
[facility]  coding opportunities       Chart reviewed, no opportunity found: CHART REVIEWED, NO OPPORTUNITY FOUND        Patients Insurance        Commercial Insurance: [address] [place] [address]

## 2022-08-12 ENCOUNTER — APPOINTMENT (OUTPATIENT)
Dept: RADIOLOGY | Age: 57
End: 2022-08-12
Payer: COMMERCIAL

## 2022-08-12 ENCOUNTER — OFFICE VISIT (OUTPATIENT)
Dept: FAMILY MEDICINE CLINIC | Facility: CLINIC | Age: 57
End: 2022-08-12
Payer: COMMERCIAL

## 2022-08-12 VITALS
TEMPERATURE: 97.7 F | HEIGHT: 65 IN | WEIGHT: 179.8 LBS | HEART RATE: 76 BPM | OXYGEN SATURATION: 96 % | BODY MASS INDEX: 29.96 KG/M2 | SYSTOLIC BLOOD PRESSURE: 100 MMHG | DIASTOLIC BLOOD PRESSURE: 74 MMHG

## 2022-08-12 DIAGNOSIS — R20.0 NUMBNESS: ICD-10-CM

## 2022-08-12 DIAGNOSIS — Z13.89 SCREENING FOR CARDIOVASCULAR, RESPIRATORY, AND GENITOURINARY DISEASES: ICD-10-CM

## 2022-08-12 DIAGNOSIS — L30.9 ECZEMA, UNSPECIFIED TYPE: ICD-10-CM

## 2022-08-12 DIAGNOSIS — Z13.83 SCREENING FOR CARDIOVASCULAR, RESPIRATORY, AND GENITOURINARY DISEASES: ICD-10-CM

## 2022-08-12 DIAGNOSIS — Z13.6 SCREENING FOR CARDIOVASCULAR, RESPIRATORY, AND GENITOURINARY DISEASES: ICD-10-CM

## 2022-08-12 DIAGNOSIS — Z00.00 ANNUAL PHYSICAL EXAM: Primary | ICD-10-CM

## 2022-08-12 PROCEDURE — 99396 PREV VISIT EST AGE 40-64: CPT | Performed by: FAMILY MEDICINE

## 2022-08-12 PROCEDURE — 3725F SCREEN DEPRESSION PERFORMED: CPT | Performed by: FAMILY MEDICINE

## 2022-08-12 PROCEDURE — 72050 X-RAY EXAM NECK SPINE 4/5VWS: CPT

## 2022-08-12 RX ORDER — BETAMETHASONE DIPROPIONATE 0.5 MG/G
CREAM TOPICAL 2 TIMES DAILY
Qty: 30 G | Refills: 0 | Status: SHIPPED | OUTPATIENT
Start: 2022-08-12

## 2022-08-12 NOTE — PROGRESS NOTES
ADULT ANNUAL [address]    NAME: Lori Mclean  AGE: 49 y o  SEX: female  : 1965     DATE: 2022     Assessment and Plan:     Problem List Items Addressed This Visit        Other    Annual physical exam - Primary      Other Visit Diagnoses     Screening for cardiovascular, respiratory, and genitourinary diseases        Relevant Orders    Comprehensive metabolic panel    Lipid panel    CBC and differential          Immunizations and preventive care screenings were discussed with patient today  Appropriate education was printed on patient's after visit summary  Counseling:  Exercise: the importance of regular exercise/physical activity was discussed  Recommend exercise 3-5 times per week for at least 30 minutes  BMI Counseling: Body mass index is 29 92 kg/m²  The BMI is above normal  Nutrition recommendations include moderation in carbohydrate intake, increasing intake of lean protein, reducing intake of saturated fat and trans fat and reducing intake of cholesterol  Pt is restarting Keto diet  Will have her repeat labs (lipids etc) 6-8 weeks after starting diet  Return in about 1 year (around 2023)  Chief Complaint:     Chief Complaint   Patient presents with    Physical Exam    refill betamethasone cream      History of Present Illness:     Adult Annual Physical   Patient here for a comprehensive physical exam  The patient reports problems - as below  - seeing chiro for neck pain and finger paresthesias  No worsening  To have xray later this week    Diet and Physical Activity  Diet/Nutrition: well balanced diet  Keto  Exercise: no formal exercise        Depression Screening  PHQ-2/9 Depression Screening    Little interest or pleasure in doing things: 0 - not at all  Feeling down, depressed, or hopeless: 0 - not at all  PHQ-2 Score: 0  PHQ-2 Interpretation: Negative depression screen       General Health  Sleep: sleep sl labile  Hearing: normal - bilateral   Vision: no vision problems and most recent eye exam <1 year ago  Dental: regular dental visits and brushes teeth twice daily  /GYN Health  Patient is: postmenopausal  Last menstrual period: 1yr ago  Contraceptive method: n/a  Review of Systems:     Review of Systems   Constitutional: Negative  HENT: Negative  Eyes: Negative  Respiratory: Negative  Cardiovascular: Negative  Gastrointestinal: Negative  Endocrine: Negative  Genitourinary: Negative  Musculoskeletal: Negative  Skin: Negative  Allergic/Immunologic: Negative  Neurological: Negative  Hematological: Negative  Psychiatric/Behavioral: Negative  Past Medical History:     Past Medical History:   Diagnosis Date    Cervical cancer [facility])     age 27    Lipoma     last assessed  Brijesh Rice 16   resolved    19        Past Surgical History:     Past Surgical History:   Procedure Laterality Date    CERVICAL CONIZATION   W/ LASER      by cold knife     SECTION      , ,       SYNOVECTOMY      radical synovectomy of tendon sheath of palm or finger   trigger thumb release in      THUMB FUSION      TUBAL LIGATION Bilateral       Social History:     Social History     Socioeconomic History    Marital status: /Civil Union     Spouse name: None    Number of children: None    Years of education: None    Highest education level: None   Occupational History     Comment: full time employment   Tobacco Use    Smoking status: Never Smoker    Smokeless tobacco: Never Used   Substance and Sexual Activity    Alcohol use: Yes     Comment: social     Drug use: No    Sexual activity: Yes   Other Topics Concern    None   Social History Narrative    Almost always uses seat belts    Daily coffee consumption     Daily tea consumption     Dental care regularly    Exercise walking    Exercise yoga    Pet animals dog    Some [facility]    Tea    Water intake adequate per day     Social Determinants of Health     Financial Resource Strain: Not on file   Food Insecurity: Not on file   Transportation Needs: Not on file   Physical Activity: Not on file   Stress: Not on file   Social Connections: Not on file   Intimate Partner Violence: Not on file   Housing Stability: Not on file      Family History:     Family History   Problem Relation Age of Onset    Breast cancer Mother 59    Diabetes Mother     Ovarian cancer Mother 39    Diabetes Father     Hypertension Family     Breast cancer Cousin 52    No Known Problems Daughter     No Known Problems Daughter     No Known Problems Maternal Aunt     No Known Problems Maternal Aunt     No Known Problems Maternal Grandmother     No Known Problems Maternal Grandfather     No Known Problems Paternal Grandmother     No Known Problems Paternal Grandfather       Current Medications:     Current Outpatient Medications   Medication Sig Dispense Refill    acyclovir (ZOVIRAX) 5 % ointment Apply topically every 3 (three) hours (6x a day) 15 g 5    betamethasone dipropionate (DIPROSONE) 0 05 % cream Apply topically 2 (two) times a day 30 g 0    ibuprofen (MOTRIN) 600 mg tablet Take by mouth      multivitamin (THERAGRAN) TABS Take 1 tablet by mouth daily       No current facility-administered medications for this visit  Allergies: Allergies   Allergen Reactions    Codeine Anaphylaxis      Physical Exam:     /74   Pulse 76   Temp 97 7 °F (36 5 °C)   Ht 5' 5" (1 651 m)   Wt 81 6 kg (179 lb 12 8 oz)   LMP 05/09/2021 (Approximate)   SpO2 96%   BMI 29 92 kg/m²     Physical Exam  Vitals reviewed  Constitutional:       Appearance: She is well-developed  HENT:      Head: Normocephalic and atraumatic        Right Ear: Tympanic membrane, ear canal and external ear normal       Left Ear: Tympanic membrane, ear canal and external ear normal       Mouth/Throat:      Mouth: Mucous membranes are moist    Eyes:      Extraocular Movements: Extraocular movements intact  Conjunctiva/sclera: Conjunctivae normal       Pupils: Pupils are equal, round, and reactive to light  Neck:      Thyroid: No thyromegaly  Vascular: No JVD  Cardiovascular:      Rate and Rhythm: Normal rate and regular rhythm  Pulses: Normal pulses  Heart sounds: Normal heart sounds  No murmur heard  Pulmonary:      Effort: Pulmonary effort is normal       Breath sounds: Normal breath sounds  No wheezing  Abdominal:      General: Bowel sounds are normal  There is no distension  Palpations: Abdomen is soft  There is no mass  Tenderness: There is no abdominal tenderness  Musculoskeletal:         General: No swelling, tenderness or deformity  Normal range of motion  Cervical back: Normal range of motion and neck supple  No muscular tenderness  Right lower leg: No edema  Left lower leg: No edema  Lymphadenopathy:      Cervical: No cervical adenopathy  Skin:     General: Skin is warm  Capillary Refill: Capillary refill takes less than 2 seconds  Neurological:      General: No focal deficit present  Mental Status: She is alert and oriented to person, place, and time  Cranial Nerves: No cranial nerve deficit  Sensory: No sensory deficit  Motor: No weakness or abnormal muscle tone  Coordination: Coordination normal       Gait: Gait normal       Deep Tendon Reflexes: Reflexes normal    Psychiatric:         Mood and Affect: Mood normal          Behavior: Behavior normal          Thought Content:  Thought content normal          Judgment: Judgment normal           MD Brigid Mcdonough

## 2022-08-12 NOTE — PATIENT INSTRUCTIONS

## 2022-12-12 ENCOUNTER — TELEPHONE (OUTPATIENT)
Dept: FAMILY MEDICINE CLINIC | Facility: CLINIC | Age: 57
End: 2022-12-12

## 2022-12-12 NOTE — TELEPHONE ENCOUNTER
Pt is asking for an appt with you around 4 p m  for a couple of issues  She would like a f/u appt for her back, states 2 of her fingers have begun to lock up and she started with a dry spot that she would like you to look at  States the week after Susana is fine

## 2022-12-21 ENCOUNTER — TELEPHONE (OUTPATIENT)
Dept: FAMILY MEDICINE CLINIC | Facility: CLINIC | Age: 57
End: 2022-12-21

## 2022-12-21 DIAGNOSIS — Z12.31 SCREENING MAMMOGRAM FOR BREAST CANCER: Primary | ICD-10-CM

## 2022-12-28 ENCOUNTER — OFFICE VISIT (OUTPATIENT)
Dept: FAMILY MEDICINE CLINIC | Facility: CLINIC | Age: 57
End: 2022-12-28

## 2022-12-28 ENCOUNTER — TELEPHONE (OUTPATIENT)
Dept: OBGYN CLINIC | Facility: HOSPITAL | Age: 57
End: 2022-12-28

## 2022-12-28 VITALS
DIASTOLIC BLOOD PRESSURE: 74 MMHG | OXYGEN SATURATION: 99 % | WEIGHT: 191 LBS | BODY MASS INDEX: 31.82 KG/M2 | TEMPERATURE: 97.9 F | HEART RATE: 77 BPM | SYSTOLIC BLOOD PRESSURE: 122 MMHG | HEIGHT: 65 IN | RESPIRATION RATE: 18 BRPM

## 2022-12-28 DIAGNOSIS — M54.9 MID BACK PAIN: Primary | ICD-10-CM

## 2022-12-28 DIAGNOSIS — Z13.6 SCREENING FOR CARDIOVASCULAR, RESPIRATORY, AND GENITOURINARY DISEASES: ICD-10-CM

## 2022-12-28 DIAGNOSIS — F33.1 MODERATE EPISODE OF RECURRENT MAJOR DEPRESSIVE DISORDER (HCC): ICD-10-CM

## 2022-12-28 DIAGNOSIS — Z13.89 SCREENING FOR CARDIOVASCULAR, RESPIRATORY, AND GENITOURINARY DISEASES: ICD-10-CM

## 2022-12-28 DIAGNOSIS — Z13.83 SCREENING FOR CARDIOVASCULAR, RESPIRATORY, AND GENITOURINARY DISEASES: ICD-10-CM

## 2022-12-28 DIAGNOSIS — G89.29 CHRONIC PAIN OF LEFT THUMB: ICD-10-CM

## 2022-12-28 DIAGNOSIS — M65.312 TRIGGER THUMB, LEFT THUMB: ICD-10-CM

## 2022-12-28 DIAGNOSIS — M65.331 TRIGGER MIDDLE FINGER OF RIGHT HAND: ICD-10-CM

## 2022-12-28 DIAGNOSIS — M79.645 CHRONIC PAIN OF LEFT THUMB: ICD-10-CM

## 2022-12-28 RX ORDER — ZINC GLUCONATE 50 MG
50 TABLET ORAL DAILY
COMMUNITY

## 2022-12-28 RX ORDER — RIBOFLAVIN (VITAMIN B2) 100 MG
100 TABLET ORAL DAILY
COMMUNITY

## 2022-12-28 NOTE — TELEPHONE ENCOUNTER
Patient is being referred to a orthopedics  Please schedule accordingly      [facility]   [phone]

## 2022-12-28 NOTE — PROGRESS NOTES
Name: Gretchen Francis      : 1965      MRN: [id number]  Encounter Provider: Giulia Cooper MD  Encounter Date: 2022   Encounter department: [address] [place] [address] Road     1  Mid back pain  Assessment & Plan:  Unclear cause  examination was without specific findings  Check x-ray of lumbar spine  Tylenol for discomfort for now  Recheck after x-rays are back    Orders:  -     XR spine lumbar minimum 4 views non injury; Future; Expected date: 2022    2  Chronic pain of left thumb  Assessment & Plan: With triggering  Patient primarily tender over the first MCP  Check x-ray  Refer to hand surgery for further evaluation and treatment    Orders:  -     Ambulatory Referral to Hand Surgery; Future  -     XR thumb left first digit-min 2v; Future; Expected date: 2022    3  Trigger thumb, left thumb  -     Ambulatory Referral to Hand Surgery; Future  -     XR thumb left first digit-min 2v; Future; Expected date: 2022    4  Trigger middle finger of right hand  Assessment & Plan:  Slightly tender along the flexor tendon over the MCP  Small nodule  For to hand surgery for further evaluation and treatment    Orders:  -     Ambulatory Referral to Hand Surgery; Future    5  Moderate episode of recurrent major depressive disorder [facility])  Assessment & Plan:  Depression Screening Follow-up Plan: Patient's depression screening was positive with a PHQ-2 score of 4  Their PHQ-9 score was 15  ?  Stress related  I reviewed with patient  We discussed treatment options  Patient refuses medication at this point  Consider counseling  Recheck 4 weeks if not improved-earlier if worse    Orders:  -     CBC and differential; Future  -     Comprehensive metabolic panel; Future  -     TSH, 3rd generation with Free T4 reflex; Future    6  Screening for cardiovascular, respiratory, and genitourinary diseases  -     Comprehensive metabolic panel;  Future  -     Lipid panel; Future           Subjective     Pt here for several issues  - pt with upper lumbar discomfort over the last 2+ months  Pt describes the discomfort as an ache  No improvement with chiro manipulations  Nothing seems to make it better or worse  Pt denies any abd pain or urinary symptoms  - has katie having some L thumb locking with burning that radiates from the thumb base to the tip  Also gets locking of R middle figner  - 2 week hx of intermittent R knee pain that radiates up her anterior/lat thigh  Brought on by kneeling  Pt fell 2-3 weeks ago and landed on her R knee  No pain going up and down stairs, increased swelling or other symptoms  - pt notes sl worsening dysphoric mood  Started around Thanksgiving  Has increased stress (multiple stressors)  Pt denies suicidal thought/plan  PHQ done  - pt denies CV, resp or other concerns    Review of Systems   Constitutional: Negative  HENT: Negative  Eyes: Negative  Respiratory: Negative  Cardiovascular: Negative  Gastrointestinal: Negative  Genitourinary: Negative  Musculoskeletal: Positive for arthralgias, back pain and myalgias  Negative for joint swelling  Skin: Negative  Neurological: Negative for dizziness, weakness, light-headedness, numbness and headaches  Psychiatric/Behavioral: Positive for dysphoric mood and sleep disturbance  Negative for agitation, decreased concentration, self-injury and suicidal ideas  The patient is nervous/anxious  Past Medical History:   Diagnosis Date   • Cervical cancer [facility])     age 27   • Lipoma     last assessed  Beck Giles 16   resolved    19       Past Surgical History:   Procedure Laterality Date   • CERVICAL CONIZATION   W/ LASER      by cold knife   •  SECTION      , ,      • SYNOVECTOMY      radical synovectomy of tendon sheath of palm or finger   trigger thumb release in     • THUMB FUSION     • TUBAL LIGATION Bilateral      Family History   Problem Relation Age of Onset   • Breast cancer Mother 59   • Diabetes Mother    • Ovarian cancer Mother 39   • Diabetes Father    • Hypertension Family    • Breast cancer Cousin 52   • No Known Problems Daughter    • No Known Problems Daughter    • No Known Problems Maternal Aunt    • No Known Problems Maternal Aunt    • No Known Problems Maternal Grandmother    • No Known Problems Maternal Grandfather    • No Known Problems Paternal Grandmother    • No Known Problems Paternal Grandfather      Social History     Socioeconomic History   • Marital status: /Civil Union     Spouse name: None   • Number of children: None   • Years of education: None   • Highest education level: None   Occupational History     Comment: full time employment   Tobacco Use   • Smoking status: Never   • Smokeless tobacco: Never   Substance and Sexual Activity   • Alcohol use: Yes     Comment: social    • Drug use: No   • Sexual activity: Yes   Other Topics Concern   • None   Social History Narrative    Almost always uses seat belts    Daily coffee consumption     Daily tea consumption     Dental care regularly    Exercise walking    Exercise yoga    Pet animals dog    Some college    Tea    Water intake adequate per day     Social Determinants of Health     Financial Resource Strain: Not on file   Food Insecurity: Not on file   Transportation Needs: Not on file   Physical Activity: Not on file   Stress: Not on file   Social Connections: Not on file   Intimate Partner Violence: Not on file   Housing Stability: Not on file     Current Outpatient Medications on File Prior to Visit   Medication Sig   • acyclovir (ZOVIRAX) 5 % ointment Apply topically every 3 (three) hours (6x a day) (Patient taking differently: Apply topically if needed)   • Ascorbic Acid (vitamin C) 100 MG tablet Take 100 mg by mouth daily   • betamethasone dipropionate (DIPROSONE) 0 05 % cream Apply topically 2 (two) times a day (Patient taking differently: Apply topically if needed) • cyanocobalamin (VITAMIN B-12) 100 MCG tablet Take 100 mcg by mouth daily   • ibuprofen (MOTRIN) 600 mg tablet Take 200 mg by mouth if needed for mild pain or moderate pain   • multivitamin (THERAGRAN) TABS Take 1 tablet by mouth daily   • zinc gluconate 50 mg tablet Take 50 mg by mouth daily     Allergies   Allergen Reactions   • Codeine Anaphylaxis     Immunization History   Administered Date(s) Administered   • COVID-19 PFIZER VACCINE 0 3 ML IM 04/16/2021, 08/26/2021       Objective     /74   Pulse 77   Temp 97 9 °F (36 6 °C) (Temporal)   Resp 18   Ht 5' 5" (1 651 m)   Wt 86 6 kg (191 lb)   LMP 05/09/2021 (Approximate)   SpO2 99%   BMI 31 78 kg/m²     Physical Exam  Vitals reviewed  Constitutional:       Appearance: She is well-developed  She is not diaphoretic  HENT:      Head: Normocephalic and atraumatic  Right Ear: Tympanic membrane, ear canal and external ear normal       Left Ear: Tympanic membrane, ear canal and external ear normal       Mouth/Throat:      Mouth: Mucous membranes are moist    Eyes:      Extraocular Movements: Extraocular movements intact  Conjunctiva/sclera: Conjunctivae normal       Pupils: Pupils are equal, round, and reactive to light  Neck:      Thyroid: No thyromegaly  Vascular: No JVD  Cardiovascular:      Rate and Rhythm: Normal rate and regular rhythm  Pulses: Normal pulses  Heart sounds: No murmur heard  Pulmonary:      Effort: Pulmonary effort is normal       Breath sounds: Normal breath sounds  Abdominal:      General: There is no distension  Palpations: Abdomen is soft  There is no mass  Tenderness: There is no abdominal tenderness  Musculoskeletal:         General: Tenderness and deformity present  No swelling  Normal range of motion  Right hand: Tenderness (R 3rd finger flexor tendon cyst) present  No swelling or deformity  Normal strength  Normal sensation  Normal pulse        Left hand: Tenderness (L thumb base) present  No swelling or deformity  Normal strength  Normal sensation  Normal pulse  Cervical back: Normal range of motion and neck supple  No tenderness  No muscular tenderness  Back:       Right knee: No swelling, deformity, effusion or erythema  Normal range of motion  Tenderness (over inferior patella and infrapatella bursa) present  No MCL, LCL, ACL, PCL or patellar tendon tenderness  No LCL laxity, MCL laxity, ACL laxity or PCL laxity  Right lower leg: No edema  Left lower leg: No edema  Lymphadenopathy:      Cervical: No cervical adenopathy  Skin:     General: Skin is warm and dry  Capillary Refill: Capillary refill takes less than 2 seconds  Neurological:      Mental Status: She is alert and oriented to person, place, and time  Cranial Nerves: No cranial nerve deficit  Sensory: No sensory deficit  Motor: No weakness or abnormal muscle tone  Gait: Gait normal       Deep Tendon Reflexes: Reflexes are normal and symmetric  Reflexes normal    Psychiatric:         Behavior: Behavior normal          Thought Content: Thought content normal          Judgment: Judgment normal       Comments: PHQ-2/9 Depression Screening    Little interest or pleasure in doing things: 3 - nearly every day  Feeling down, depressed, or hopeless: 1 - several days  Trouble falling or staying asleep, or sleeping too much: 3 - nearly every   day  Feeling tired or having little energy: 3 - nearly every day  Poor appetite or overeatin - more than half the days  Feeling bad about yourself - or that you are a failure or have let   yourself or your family down: 1 - several days  Trouble concentrating on things, such as reading the newspaper or watching   television: 1 - several days  Moving or speaking so slowly that other people could have noticed   Or the   opposite - being so fidgety or restless that you have been moving around a   lot more than usual: 1 - several days  Thoughts that you would be better off dead, or of hurting yourself in some   way: 0 - not at all  PHQ-2 Score: 4  PHQ-2 Interpretation: POSITIVE depression screen  PHQ-9 Score: 15   PHQ-9 Interpretation: Moderately severe depression             Giulia Cooper MD

## 2022-12-29 PROBLEM — F33.1 MODERATE EPISODE OF RECURRENT MAJOR DEPRESSIVE DISORDER (HCC): Status: ACTIVE | Noted: 2022-12-29

## 2022-12-29 PROBLEM — G89.29 CHRONIC PAIN OF LEFT THUMB: Status: ACTIVE | Noted: 2022-12-29

## 2022-12-29 PROBLEM — M65.331 TRIGGER MIDDLE FINGER OF RIGHT HAND: Status: ACTIVE | Noted: 2022-12-29

## 2022-12-29 PROBLEM — M79.645 CHRONIC PAIN OF LEFT THUMB: Status: ACTIVE | Noted: 2022-12-29

## 2022-12-29 PROBLEM — M54.9 MID BACK PAIN: Status: ACTIVE | Noted: 2022-12-29

## 2022-12-30 NOTE — ASSESSMENT & PLAN NOTE
With triggering  Patient primarily tender over the first MCP  Check x-ray    Refer to hand surgery for further evaluation and treatment

## 2022-12-30 NOTE — ASSESSMENT & PLAN NOTE
Unclear cause  examination was without specific findings  Check x-ray of lumbar spine  Tylenol for discomfort for now    Recheck after x-rays are back

## 2022-12-30 NOTE — ASSESSMENT & PLAN NOTE
Depression Screening Follow-up Plan: Patient's depression screening was positive with a PHQ-2 score of 4  Their PHQ-9 score was 15  ?  Stress related  I reviewed with patient  We discussed treatment options  Patient refuses medication at this point  Consider counseling    Recheck 4 weeks if not improved-earlier if worse

## 2022-12-30 NOTE — ASSESSMENT & PLAN NOTE
Slightly tender along the flexor tendon over the MCP  Small nodule    For to hand surgery for further evaluation and treatment

## 2023-02-01 ENCOUNTER — APPOINTMENT (OUTPATIENT)
Dept: RADIOLOGY | Age: 58
End: 2023-02-01

## 2023-02-01 DIAGNOSIS — M79.645 CHRONIC PAIN OF LEFT THUMB: ICD-10-CM

## 2023-02-01 DIAGNOSIS — M65.312 TRIGGER THUMB, LEFT THUMB: ICD-10-CM

## 2023-02-01 DIAGNOSIS — M54.9 MID BACK PAIN: ICD-10-CM

## 2023-02-01 DIAGNOSIS — G89.29 CHRONIC PAIN OF LEFT THUMB: ICD-10-CM

## 2023-02-03 LAB
ALBUMIN SERPL-MCNC: 4.5 G/DL (ref 3.6–5.1)
ALBUMIN/GLOB SERPL: 1.5 (CALC) (ref 1–2.5)
ALP SERPL-CCNC: 96 U/L (ref 37–153)
ALT SERPL-CCNC: 21 U/L (ref 6–29)
AST SERPL-CCNC: 24 U/L (ref 10–35)
BASOPHILS # BLD AUTO: 32 CELLS/UL (ref 0–200)
BASOPHILS NFR BLD AUTO: 0.7 %
BILIRUB SERPL-MCNC: 0.5 MG/DL (ref 0.2–1.2)
BUN SERPL-MCNC: 15 MG/DL (ref 7–25)
BUN/CREAT SERPL: NORMAL (CALC) (ref 6–22)
CALCIUM SERPL-MCNC: 9.8 MG/DL (ref 8.6–10.4)
CHLORIDE SERPL-SCNC: 104 MMOL/L (ref 98–110)
CHOLEST SERPL-MCNC: 225 MG/DL
CHOLEST/HDLC SERPL: 2.6 (CALC)
CO2 SERPL-SCNC: 28 MMOL/L (ref 20–32)
CREAT SERPL-MCNC: 0.83 MG/DL (ref 0.5–1.03)
EOSINOPHIL # BLD AUTO: 101 CELLS/UL (ref 15–500)
EOSINOPHIL NFR BLD AUTO: 2.2 %
ERYTHROCYTE [DISTWIDTH] IN BLOOD BY AUTOMATED COUNT: 14.2 % (ref 11–15)
GFR/BSA.PRED SERPLBLD CYS-BASED-ARV: 83 ML/MIN/1.73M2
GLOBULIN SER CALC-MCNC: 3 G/DL (CALC) (ref 1.9–3.7)
GLUCOSE SERPL-MCNC: 95 MG/DL (ref 65–99)
HCT VFR BLD AUTO: 38.2 % (ref 35–45)
HDLC SERPL-MCNC: 85 MG/DL
HGB BLD-MCNC: 12.4 G/DL (ref 11.7–15.5)
LDLC SERPL CALC-MCNC: 124 MG/DL (CALC)
LYMPHOCYTES # BLD AUTO: 1573 CELLS/UL (ref 850–3900)
LYMPHOCYTES NFR BLD AUTO: 34.2 %
MCH RBC QN AUTO: 24.8 PG (ref 27–33)
MCHC RBC AUTO-ENTMCNC: 32.5 G/DL (ref 32–36)
MCV RBC AUTO: 76.4 FL (ref 80–100)
MONOCYTES # BLD AUTO: 432 CELLS/UL (ref 200–950)
MONOCYTES NFR BLD AUTO: 9.4 %
NEUTROPHILS # BLD AUTO: 2461 CELLS/UL (ref 1500–7800)
NEUTROPHILS NFR BLD AUTO: 53.5 %
NONHDLC SERPL-MCNC: 140 MG/DL (CALC)
PLATELET # BLD AUTO: 357 THOUSAND/UL (ref 140–400)
PMV BLD REES-ECKER: 10.2 FL (ref 7.5–12.5)
POTASSIUM SERPL-SCNC: 5.2 MMOL/L (ref 3.5–5.3)
PROT SERPL-MCNC: 7.5 G/DL (ref 6.1–8.1)
RBC # BLD AUTO: 5 MILLION/UL (ref 3.8–5.1)
SODIUM SERPL-SCNC: 140 MMOL/L (ref 135–146)
TRIGL SERPL-MCNC: 69 MG/DL
TSH SERPL-ACNC: 1.5 MIU/L
WBC # BLD AUTO: 4.6 THOUSAND/UL (ref 3.8–10.8)

## 2023-02-06 DIAGNOSIS — M54.9 MID BACK PAIN: Primary | ICD-10-CM

## 2023-02-08 ENCOUNTER — OFFICE VISIT (OUTPATIENT)
Dept: OBGYN CLINIC | Facility: CLINIC | Age: 58
End: 2023-02-08

## 2023-02-08 VITALS
DIASTOLIC BLOOD PRESSURE: 70 MMHG | HEART RATE: 71 BPM | HEIGHT: 65 IN | WEIGHT: 190.6 LBS | SYSTOLIC BLOOD PRESSURE: 107 MMHG | BODY MASS INDEX: 31.75 KG/M2

## 2023-02-08 DIAGNOSIS — M79.645 CHRONIC PAIN OF LEFT THUMB: ICD-10-CM

## 2023-02-08 DIAGNOSIS — G89.29 CHRONIC PAIN OF LEFT THUMB: ICD-10-CM

## 2023-02-08 DIAGNOSIS — Z01.812 PRE-PROCEDURE LAB EXAM: Primary | ICD-10-CM

## 2023-02-08 DIAGNOSIS — M65.312 TRIGGER THUMB, LEFT THUMB: ICD-10-CM

## 2023-02-08 DIAGNOSIS — M65.331 TRIGGER MIDDLE FINGER OF RIGHT HAND: ICD-10-CM

## 2023-02-08 RX ORDER — CHLORHEXIDINE GLUCONATE 0.12 MG/ML
15 RINSE ORAL ONCE
OUTPATIENT
Start: 2023-02-08 | End: 2023-02-08

## 2023-02-08 NOTE — PROGRESS NOTES
Kelvin ELLIOTT  Attending, Orthopaedic Surgery  Hand, Wrist, and Elbow Surgery  [facility]      ORTHOPAEDIC HAND, WRIST, AND ELBOW OFFICE  VISIT       ASSESSMENT/PLAN:      56 y o  female with right long and left thumb trigger fingers     The etiology of above diagnosis was discussed along with treatment options  Trigger finger CSI's vs trigger finger release was discussed at length including risks and benefits  Left trigger thumb release was discussed at length including risks and benefits  Risks of benefits consist of but not limited to numb patch about thumb, bleeding, infection, stiffness, pain, injury to surrounding structures, reoccurrence, need for further surgery, etc  Anisa Parker elected to proceed with a left trigger thumb release and informed surgical consent was signed  Post operative instructions/expectations were reviewed and provided in AVS  Lab work and EKG will be obtained prior to surgical intervention  Follow up in the office 10-14 days following surgery for suture removal        The patient verbalized understanding of exam findings and treatment plan  We engaged in the shared decision-making process and treatment options were discussed at length with the patient  Surgical and conservative management discussed today along with risks and benefits  Diagnoses and all orders for this visit:    Trigger thumb, left thumb  -     Ambulatory Referral to Hand Surgery    Trigger middle finger of right hand  -     Ambulatory Referral to Hand Surgery    Chronic pain of left thumb  -     Ambulatory Referral to Hand Surgery      Follow Up:  Return for 10-14 days , post op  To Do Next Visit:  Re-evaluation of current issue and Sutures out      General Discussions:  Trigger Finger: The anatomy and physiology of trigger finger was discussed with the patient today in the office    Edema and increased contact pressure within the flexor tendons at the A1 pulley can cause pain, crepitation, and triggering or locking of the digit resulting in limitation of function  Symptoms can occur at anytime but are typically worse in the morning or after a brief rest from repetitive activity  Treatment options include resting/nighttime MP blocking splints to decrease edema, oral anti-inflammatory medications, home or formal therapy exercises, up to 2 steroid injections within the tendon sheath, or surgical release  While majority of patients do respond to conservative treatment, up to 20% may require surgical release  Operative Discussions:  Trigger Finger Release: The anatomy and physiology of trigger finger was discussed with the patient today in the office  Edema and increased contact pressure within the flexor tendons at the A1 pulley can cause pain, crepitation, and limitation of function  Treatment options include resting MP blocking splints to decrease edema, oral anti-inflammatory medications, home or formal therapy exercises, up to 2 steroid injections or surgical release  While majority of patients do respond to conservative treatment, up to 20% may require surgical release  The patient has elected release of the trigger finger  The patient has elected to undergo a release of the A1 pulley (trigger finger)  A small incision will be made over the palmar aspect of the hand, the tendon sheath holding the flexor tendons will be released  In the postoperative period, light activities are allowed immediately, driving is allowed when narcotic medication has stopped, and the incision may get wet after 2 days  Heavy activities (lifting more than approximately 10 pounds) will be allowed after the follow up appointment in 1-2 weeks  While the pain and discomfort within the wrist typically improves rapidly, some residual discomfort may be present for up to 6 weeks    The nodule that is typically palpable in the palmar aspect of the hand will not be removed, as this would necessitate removal of a portion of the flexor tendon, however the catching, clicking, and locking should resolve  Approximate success rate is 98%  The risks and benefits of the procedure were explained to the patient, which include, but are not limited to: Bleeding, infection, recurrence, pain, scar, damage to tendons, damage to nerves, and damage to blood vessels, need for future surgery and complications related to anesthesia  If bony work is done, risks also include malunion and nonunion  These risks, along with alternative conservative treatment options, and postoperative protocols were voiced back and understood by the patient  All questions were answered to the patient's satisfaction  The patient agrees to comply with a standard postoperative protocol, and is willing to proceed  Education was provided via written and auditory forms  There were no barriers to learning  Written handouts regarding wound care, incision and scar care, and general preoperative information, as well as risks and benefits were provided to the patient       ____________________________________________________________________________________________________________________________________________      CHIEF COMPLAINT:  Chief Complaint   Patient presents with   • Right Middle Finger - Locking, Pain   • Left Thumb - Pain, Locking       SUBJECTIVE:  Gianna Pugh is a 56y o  year old RHD female who presents to the office for evaluation of her left thumb and right long finger  She notes her left thumb and right long finger has been clicking, catching and locking for a few months  She feels her symptoms are worsening  Left thumb is currently worse then her right long finger  She does have to use the other hand to unlock the fingers when they locking occurs  She notes pain to the A1 pulleys of the left thumb and right long finger  She has a history of a right thumb trigger finger release around 4033-8100   She does not remember trying trigger finger CSI's for her right thumb at that time  Pain/symptom timing:  Worse during the day when active  Pain/symptom context:  Worse with activites and work  Pain/symptom modifying factors:  Rest makes better, activities make worse  Pain/symptom associated signs/symptoms: none    Prior treatment   · NSAIDsNo   · Injections No   · Bracing/Orthotics No    Physical Therapy No     I have personally reviewed all the relevant PMH, PSH, SH, FH, Medications and allergies      PAST MEDICAL HISTORY:  Past Medical History:   Diagnosis Date   • Cervical cancer ([facility] [address] )     age 27   • Lipoma     last assessed  Britton Heck 16   resolved    19         PAST SURGICAL HISTORY:  Past Surgical History:   Procedure Laterality Date   • CERVICAL CONIZATION   W/ LASER      by cold knife   •  SECTION      , ,      • SYNOVECTOMY      radical synovectomy of tendon sheath of palm or finger   trigger thumb release in     • THUMB FUSION     • TUBAL LIGATION Bilateral        FAMILY HISTORY:  Family History   Problem Relation Age of Onset   • Breast cancer Mother 59   • Diabetes Mother    • Ovarian cancer Mother 39   • Diabetes Father    • Hypertension Family    • Breast cancer Cousin 52   • No Known Problems Daughter    • No Known Problems Daughter    • No Known Problems Maternal Aunt    • No Known Problems Maternal Aunt    • No Known Problems Maternal Grandmother    • No Known Problems Maternal Grandfather    • No Known Problems Paternal Grandmother    • No Known Problems Paternal Grandfather        SOCIAL HISTORY:  Social History     Tobacco Use   • Smoking status: Never   • Smokeless tobacco: Never   Substance Use Topics   • Alcohol use: Yes     Comment: social    • Drug use: No       MEDICATIONS:    Current Outpatient Medications:   •  acyclovir (ZOVIRAX) 5 % ointment, Apply topically every 3 (three) hours (6x a day) (Patient taking differently: Apply topically if needed), Disp: 15 g, Rfl: 5  •  Ascorbic Acid (vitamin C) 100 MG tablet, Take 100 mg by mouth if needed, Disp: , Rfl:   •  betamethasone dipropionate (DIPROSONE) 0 05 % cream, Apply topically 2 (two) times a day (Patient taking differently: Apply topically if needed), Disp: 30 g, Rfl: 0  •  cyanocobalamin (VITAMIN B-12) 100 MCG tablet, Take 100 mcg by mouth daily, Disp: , Rfl:   •  ibuprofen (MOTRIN) 600 mg tablet, Take 200 mg by mouth if needed for mild pain or moderate pain, Disp: , Rfl:   •  multivitamin (THERAGRAN) TABS, Take 1 tablet by mouth daily, Disp: , Rfl:   •  zinc gluconate 50 mg tablet, Take 50 mg by mouth daily (Patient not taking: Reported on 2/8/2023), Disp: , Rfl:     ALLERGIES:  Allergies   Allergen Reactions   • Codeine Anaphylaxis           REVIEW OF SYSTEMS:  Review of Systems   Constitutional: Negative for chills, fever and unexpected weight change  HENT: Negative for hearing loss, nosebleeds and sore throat  Eyes: Negative for pain, redness and visual disturbance  Respiratory: Negative for cough, shortness of breath and wheezing  Cardiovascular: Negative for chest pain, palpitations and leg swelling  Gastrointestinal: Negative for abdominal pain, nausea and vomiting  Endocrine: Negative for polydipsia and polyuria  Genitourinary: Negative for difficulty urinating and hematuria  Musculoskeletal: Negative for arthralgias, joint swelling and myalgias  Skin: Negative for rash and wound  Neurological: Negative for dizziness, numbness and headaches  Psychiatric/Behavioral: Negative for decreased concentration, dysphoric mood and suicidal ideas  The patient is not nervous/anxious          VITALS:  Vitals:    02/08/23 1212   BP: 107/70   Pulse: 71       LABS:  HgA1c: No results found for: HGBA1C  BMP:   Lab Results   Component Value Date    CALCIUM 9 8 02/03/2023     01/26/2017    K 5 2 02/03/2023    CO2 28 02/03/2023     02/03/2023    BUN 15 02/03/2023    CREATININE 0 83 02/03/2023 _____________________________________________________  PHYSICAL EXAMINATION:  General: well developed and well nourished, alert, oriented times 3 and appears comfortable  Psychiatric: Normal  HEENT: Normocephalic, Atraumatic Trachea Midline, No torticollis  Pulmonary: No audible wheezing or respiratory distress   Abdomen/GI: Non tender, non distended   Cardiovascular: No pitting edema, 2+ radial pulse   Skin: No masses, erythema, lacerations, fluctation, ulcerations  Neurovascular: Sensation Intact to the Median, Ulnar, Radial Nerve, Motor Intact to the Median, Ulnar, Radial Nerve and Pulses Intact  Musculoskeletal: Normal, except as noted in detailed exam and in HPI  MUSCULOSKELETAL EXAMINATION:    Left thumb:  Positive palpable nodule over the A1 pulley  Positive tenderness to palpation over A1 pulley  Positive catching  Positive clicking  Full composite fist  Brisk capillary refill  Right long finger:  Positive palpable nodule over the A1 pulley  Positive tenderness to palpation over A1 pulley  Negative catching  Positive clicking   Full composite fist  Brisk capillary refill      ___________________________________________________  STUDIES REVIEWED:  I have personally reviewed AP lateral and oblique radiographs of left thumb    which demonstrate no acute fx/ dislocation, mild CMC arthrosis            PROCEDURES PERFORMED:  Procedures  No Procedures performed today    _____________________________________________________      Benito Police    I,:  Jason Alvarado am acting as a scribe while in the presence of the attending physician :       I,:  Eva Walker MD personally performed the services described in this documentation    as scribed in my presence :

## 2023-02-08 NOTE — H&P
Baldemar ELLIOTT  Attending, Orthopaedic Surgery  Hand, Wrist, and Elbow Surgery  [facility]      ORTHOPAEDIC HAND, WRIST, AND ELBOW OFFICE  VISIT       ASSESSMENT/PLAN:      56 y o  female with right long and left thumb trigger fingers     The etiology of above diagnosis was discussed along with treatment options  Trigger finger CSI's vs trigger finger release was discussed at length including risks and benefits  Left trigger thumb release was discussed at length including risks and benefits  Risks of benefits consist of but not limited to numb patch about thumb, bleeding, infection, stiffness, pain, injury to surrounding structures, reoccurrence, need for further surgery, etc  Meet Jacob elected to proceed with a left trigger thumb release and informed surgical consent was signed  Post operative instructions/expectations were reviewed and provided in AVS  Lab work and EKG will be obtained prior to surgical intervention  Follow up in the office 10-14 days following surgery for suture removal        The patient verbalized understanding of exam findings and treatment plan  We engaged in the shared decision-making process and treatment options were discussed at length with the patient  Surgical and conservative management discussed today along with risks and benefits  Diagnoses and all orders for this visit:    Trigger thumb, left thumb  -     Ambulatory Referral to Hand Surgery    Trigger middle finger of right hand  -     Ambulatory Referral to Hand Surgery    Chronic pain of left thumb  -     Ambulatory Referral to Hand Surgery      Follow Up:  Return for 10-14 days , post op  To Do Next Visit:  Re-evaluation of current issue and Sutures out      General Discussions:  Trigger Finger: The anatomy and physiology of trigger finger was discussed with the patient today in the office    Edema and increased contact pressure within the flexor tendons at the A1 pulley can cause pain, crepitation, and triggering or locking of the digit resulting in limitation of function  Symptoms can occur at anytime but are typically worse in the morning or after a brief rest from repetitive activity  Treatment options include resting/nighttime MP blocking splints to decrease edema, oral anti-inflammatory medications, home or formal therapy exercises, up to 2 steroid injections within the tendon sheath, or surgical release  While majority of patients do respond to conservative treatment, up to 20% may require surgical release  Operative Discussions:  Trigger Finger Release: The anatomy and physiology of trigger finger was discussed with the patient today in the office  Edema and increased contact pressure within the flexor tendons at the A1 pulley can cause pain, crepitation, and limitation of function  Treatment options include resting MP blocking splints to decrease edema, oral anti-inflammatory medications, home or formal therapy exercises, up to 2 steroid injections or surgical release  While majority of patients do respond to conservative treatment, up to 20% may require surgical release  The patient has elected release of the trigger finger  The patient has elected to undergo a release of the A1 pulley (trigger finger)  A small incision will be made over the palmar aspect of the hand, the tendon sheath holding the flexor tendons will be released  In the postoperative period, light activities are allowed immediately, driving is allowed when narcotic medication has stopped, and the incision may get wet after 2 days  Heavy activities (lifting more than approximately 10 pounds) will be allowed after the follow up appointment in 1-2 weeks  While the pain and discomfort within the wrist typically improves rapidly, some residual discomfort may be present for up to 6 weeks    The nodule that is typically palpable in the palmar aspect of the hand will not be removed, as this would necessitate removal of a portion of the flexor tendon, however the catching, clicking, and locking should resolve  Approximate success rate is 98%  The risks and benefits of the procedure were explained to the patient, which include, but are not limited to: Bleeding, infection, recurrence, pain, scar, damage to tendons, damage to nerves, and damage to blood vessels, need for future surgery and complications related to anesthesia  If bony work is done, risks also include malunion and nonunion  These risks, along with alternative conservative treatment options, and postoperative protocols were voiced back and understood by the patient  All questions were answered to the patient's satisfaction  The patient agrees to comply with a standard postoperative protocol, and is willing to proceed  Education was provided via written and auditory forms  There were no barriers to learning  Written handouts regarding wound care, incision and scar care, and general preoperative information, as well as risks and benefits were provided to the patient       ____________________________________________________________________________________________________________________________________________      CHIEF COMPLAINT:  Chief Complaint   Patient presents with   • Right Middle Finger - Locking, Pain   • Left Thumb - Pain, Locking       SUBJECTIVE:  Carlos Fuchs is a 56y o  year old RHD female who presents to the office for evaluation of her left thumb and right long finger  She notes her left thumb and right long finger has been clicking, catching and locking for a few months  She feels her symptoms are worsening  Left thumb is currently worse then her right long finger  She does have to use the other hand to unlock the fingers when they locking occurs  She notes pain to the A1 pulleys of the left thumb and right long finger  She has a history of a right thumb trigger finger release around 2550-6349   She does not remember trying trigger finger CSI's for her right thumb at that time  Pain/symptom timing:  Worse during the day when active  Pain/symptom context:  Worse with activites and work  Pain/symptom modifying factors:  Rest makes better, activities make worse  Pain/symptom associated signs/symptoms: none    Prior treatment   · NSAIDsNo   · Injections No   · Bracing/Orthotics No    Physical Therapy No     I have personally reviewed all the relevant PMH, PSH, SH, FH, Medications and allergies      PAST MEDICAL HISTORY:  Past Medical History:   Diagnosis Date   • Cervical cancer ([facility] [address] )     age 27   • Lipoma     last assessed  Derenda Slate Derenda Slate Derenda Slate 16   resolved    19         PAST SURGICAL HISTORY:  Past Surgical History:   Procedure Laterality Date   • CERVICAL CONIZATION   W/ LASER      by cold knife   •  SECTION      , ,      • SYNOVECTOMY      radical synovectomy of tendon sheath of palm or finger   trigger thumb release in     • THUMB FUSION     • TUBAL LIGATION Bilateral        FAMILY HISTORY:  Family History   Problem Relation Age of Onset   • Breast cancer Mother 59   • Diabetes Mother    • Ovarian cancer Mother 39   • Diabetes Father    • Hypertension Family    • Breast cancer Cousin 52   • No Known Problems Daughter    • No Known Problems Daughter    • No Known Problems Maternal Aunt    • No Known Problems Maternal Aunt    • No Known Problems Maternal Grandmother    • No Known Problems Maternal Grandfather    • No Known Problems Paternal Grandmother    • No Known Problems Paternal Grandfather        SOCIAL HISTORY:  Social History     Tobacco Use   • Smoking status: Never   • Smokeless tobacco: Never   Substance Use Topics   • Alcohol use: Yes     Comment: social    • Drug use: No       MEDICATIONS:    Current Outpatient Medications:   •  acyclovir (ZOVIRAX) 5 % ointment, Apply topically every 3 (three) hours (6x a day) (Patient taking differently: Apply topically if needed), Disp: 15 g, Rfl: 5  •  Ascorbic Acid (vitamin C) 100 MG tablet, Take 100 mg by mouth if needed, Disp: , Rfl:   •  betamethasone dipropionate (DIPROSONE) 0 05 % cream, Apply topically 2 (two) times a day (Patient taking differently: Apply topically if needed), Disp: 30 g, Rfl: 0  •  cyanocobalamin (VITAMIN B-12) 100 MCG tablet, Take 100 mcg by mouth daily, Disp: , Rfl:   •  ibuprofen (MOTRIN) 600 mg tablet, Take 200 mg by mouth if needed for mild pain or moderate pain, Disp: , Rfl:   •  multivitamin (THERAGRAN) TABS, Take 1 tablet by mouth daily, Disp: , Rfl:   •  zinc gluconate 50 mg tablet, Take 50 mg by mouth daily (Patient not taking: Reported on 2/8/2023), Disp: , Rfl:     ALLERGIES:  Allergies   Allergen Reactions   • Codeine Anaphylaxis           REVIEW OF SYSTEMS:  Review of Systems   Constitutional: Negative for chills, fever and unexpected weight change  HENT: Negative for hearing loss, nosebleeds and sore throat  Eyes: Negative for pain, redness and visual disturbance  Respiratory: Negative for cough, shortness of breath and wheezing  Cardiovascular: Negative for chest pain, palpitations and leg swelling  Gastrointestinal: Negative for abdominal pain, nausea and vomiting  Endocrine: Negative for polydipsia and polyuria  Genitourinary: Negative for difficulty urinating and hematuria  Musculoskeletal: Negative for arthralgias, joint swelling and myalgias  Skin: Negative for rash and wound  Neurological: Negative for dizziness, numbness and headaches  Psychiatric/Behavioral: Negative for decreased concentration, dysphoric mood and suicidal ideas  The patient is not nervous/anxious          VITALS:  Vitals:    02/08/23 1212   BP: 107/70   Pulse: 71       LABS:  HgA1c: No results found for: HGBA1C  BMP:   Lab Results   Component Value Date    CALCIUM 9 8 02/03/2023     01/26/2017    K 5 2 02/03/2023    CO2 28 02/03/2023     02/03/2023    BUN 15 02/03/2023    CREATININE 0 83 02/03/2023 _____________________________________________________  PHYSICAL EXAMINATION:  General: well developed and well nourished, alert, oriented times 3 and appears comfortable  Psychiatric: Normal  HEENT: Normocephalic, Atraumatic Trachea Midline, No torticollis  Pulmonary: No audible wheezing or respiratory distress   Abdomen/GI: Non tender, non distended   Cardiovascular: No pitting edema, 2+ radial pulse   Skin: No masses, erythema, lacerations, fluctation, ulcerations  Neurovascular: Sensation Intact to the Median, Ulnar, Radial Nerve, Motor Intact to the Median, Ulnar, Radial Nerve and Pulses Intact  Musculoskeletal: Normal, except as noted in detailed exam and in HPI  MUSCULOSKELETAL EXAMINATION:    Left thumb:  Positive palpable nodule over the A1 pulley  Positive tenderness to palpation over A1 pulley  Positive catching  Positive clicking  Full composite fist  Brisk capillary refill  Right long finger:  Positive palpable nodule over the A1 pulley  Positive tenderness to palpation over A1 pulley  Negative catching  Positive clicking   Full composite fist  Brisk capillary refill      ___________________________________________________  STUDIES REVIEWED:  I have personally reviewed AP lateral and oblique radiographs of left thumb    which demonstrate no acute fx/ dislocation, mild CMC arthrosis            PROCEDURES PERFORMED:  Procedures  No Procedures performed today    _____________________________________________________      Marie Polanco    I,:  Severa Cleaves Ditmars am acting as a scribe while in the presence of the attending physician :       I,:  Ulises Shaw MD personally performed the services described in this documentation    as scribed in my presence :

## 2023-02-08 NOTE — LETTER
February 8, 2023     John Loera MD  [address]    Patient: Anamika Patel   YOB: 1965   Date of Visit: 2/8/2023       Dear Dr Seth Del Cid:    Thank you for referring Duong Arellano to me for evaluation  Below are my notes for this consultation  If you have questions, please do not hesitate to call me  I look forward to following your patient along with you  Sincerely,        Unique Flores MD        CC: No Recipients  Unique Flores MD  2/8/2023 12:51 PM  Malka ELLIOTT  Attending, Orthopaedic Surgery  Hand, Wrist, and Elbow Surgery  [facility]      ORTHOPAEDIC HAND, WRIST, AND ELBOW OFFICE  VISIT       ASSESSMENT/PLAN:      56 y o  female with right long and left thumb trigger fingers     The etiology of above diagnosis was discussed along with treatment options  Trigger finger CSI's vs trigger finger release was discussed at length including risks and benefits  Left trigger thumb release was discussed at length including risks and benefits  Risks of benefits consist of but not limited to numb patch about thumb, bleeding, infection, stiffness, pain, injury to surrounding structures, reoccurrence, need for further surgery, etc  Cain Mann elected to proceed with a left trigger thumb release and informed surgical consent was signed  Post operative instructions/expectations were reviewed and provided in AVS  Lab work and EKG will be obtained prior to surgical intervention  Follow up in the office 10-14 days following surgery for suture removal        The patient verbalized understanding of exam findings and treatment plan  We engaged in the shared decision-making process and treatment options were discussed at length with the patient  Surgical and conservative management discussed today along with risks and benefits      Diagnoses and all orders for this visit:    Trigger thumb, left thumb  - Ambulatory Referral to Hand Surgery    Trigger middle finger of right hand  -     Ambulatory Referral to Hand Surgery    Chronic pain of left thumb  -     Ambulatory Referral to Hand Surgery      Follow Up:  Return for 10-14 days , post op  To Do Next Visit:  Re-evaluation of current issue and Sutures out      General Discussions:  Trigger Finger: The anatomy and physiology of trigger finger was discussed with the patient today in the office  Edema and increased contact pressure within the flexor tendons at the A1 pulley can cause pain, crepitation, and triggering or locking of the digit resulting in limitation of function  Symptoms can occur at anytime but are typically worse in the morning or after a brief rest from repetitive activity  Treatment options include resting/nighttime MP blocking splints to decrease edema, oral anti-inflammatory medications, home or formal therapy exercises, up to 2 steroid injections within the tendon sheath, or surgical release  While majority of patients do respond to conservative treatment, up to 20% may require surgical release  Operative Discussions:  Trigger Finger Release: The anatomy and physiology of trigger finger was discussed with the patient today in the office  Edema and increased contact pressure within the flexor tendons at the A1 pulley can cause pain, crepitation, and limitation of function  Treatment options include resting MP blocking splints to decrease edema, oral anti-inflammatory medications, home or formal therapy exercises, up to 2 steroid injections or surgical release  While majority of patients do respond to conservative treatment, up to 20% may require surgical release  The patient has elected release of the trigger finger  The patient has elected to undergo a release of the A1 pulley (trigger finger)  A small incision will be made over the palmar aspect of the hand, the tendon sheath holding the flexor tendons will be released    In the postoperative period, light activities are allowed immediately, driving is allowed when narcotic medication has stopped, and the incision may get wet after 2 days  Heavy activities (lifting more than approximately 10 pounds) will be allowed after the follow up appointment in 1-2 weeks  While the pain and discomfort within the wrist typically improves rapidly, some residual discomfort may be present for up to 6 weeks  The nodule that is typically palpable in the palmar aspect of the hand will not be removed, as this would necessitate removal of a portion of the flexor tendon, however the catching, clicking, and locking should resolve  Approximate success rate is 98%  The risks and benefits of the procedure were explained to the patient, which include, but are not limited to: Bleeding, infection, recurrence, pain, scar, damage to tendons, damage to nerves, and damage to blood vessels, need for future surgery and complications related to anesthesia  If bony work is done, risks also include malunion and nonunion  These risks, along with alternative conservative treatment options, and postoperative protocols were voiced back and understood by the patient  All questions were answered to the patient's satisfaction  The patient agrees to comply with a standard postoperative protocol, and is willing to proceed  Education was provided via written and auditory forms  There were no barriers to learning   Written handouts regarding wound care, incision and scar care, and general preoperative information, as well as risks and benefits were provided to the patient       ____________________________________________________________________________________________________________________________________________      CHIEF COMPLAINT:  Chief Complaint   Patient presents with   • Right Middle Finger - Locking, Pain   • Left Thumb - Pain, Locking       SUBJECTIVE:  Marquis Christian is a 56y o  year old RHD female who presents to the office for evaluation of her left thumb and right long finger  She notes her left thumb and right long finger has been clicking, catching and locking for a few months  She feels her symptoms are worsening  Left thumb is currently worse then her right long finger  She does have to use the other hand to unlock the fingers when they locking occurs  She notes pain to the A1 pulleys of the left thumb and right long finger  She has a history of a right thumb trigger finger release around 1198-7708  She does not remember trying trigger finger CSI's for her right thumb at that time  Pain/symptom timing:  Worse during the day when active  Pain/symptom context:  Worse with activites and work  Pain/symptom modifying factors:  Rest makes better, activities make worse  Pain/symptom associated signs/symptoms: none    Prior treatment   · NSAIDsNo   · Injections No   · Bracing/Orthotics No    Physical Therapy No     I have personally reviewed all the relevant PMH, PSH, SH, FH, Medications and allergies      PAST MEDICAL HISTORY:  Past Medical History:   Diagnosis Date   • Cervical cancer ([facility] [address] )     age 27   • Lipoma     last assessed  Armando Pena 16   resolved    19         PAST SURGICAL HISTORY:  Past Surgical History:   Procedure Laterality Date   • CERVICAL CONIZATION   W/ LASER      by cold knife   •  SECTION      , ,      • SYNOVECTOMY      radical synovectomy of tendon sheath of palm or finger   trigger thumb release in     • THUMB FUSION     • TUBAL LIGATION Bilateral        FAMILY HISTORY:  Family History   Problem Relation Age of Onset   • Breast cancer Mother 59   • Diabetes Mother    • Ovarian cancer Mother 39   • Diabetes Father    • Hypertension Family    • Breast cancer Cousin 52   • No Known Problems Daughter    • No Known Problems Daughter    • No Known Problems Maternal Aunt    • No Known Problems Maternal Aunt    • No Known Problems Maternal Grandmother    • No Known Problems Maternal Grandfather    • No Known Problems Paternal Grandmother    • No Known Problems Paternal Grandfather        SOCIAL HISTORY:  Social History     Tobacco Use   • Smoking status: Never   • Smokeless tobacco: Never   Substance Use Topics   • Alcohol use: Yes     Comment: social    • Drug use: No       MEDICATIONS:    Current Outpatient Medications:   •  acyclovir (ZOVIRAX) 5 % ointment, Apply topically every 3 (three) hours (6x a day) (Patient taking differently: Apply topically if needed), Disp: 15 g, Rfl: 5  •  Ascorbic Acid (vitamin C) 100 MG tablet, Take 100 mg by mouth if needed, Disp: , Rfl:   •  betamethasone dipropionate (DIPROSONE) 0 05 % cream, Apply topically 2 (two) times a day (Patient taking differently: Apply topically if needed), Disp: 30 g, Rfl: 0  •  cyanocobalamin (VITAMIN B-12) 100 MCG tablet, Take 100 mcg by mouth daily, Disp: , Rfl:   •  ibuprofen (MOTRIN) 600 mg tablet, Take 200 mg by mouth if needed for mild pain or moderate pain, Disp: , Rfl:   •  multivitamin (THERAGRAN) TABS, Take 1 tablet by mouth daily, Disp: , Rfl:   •  zinc gluconate 50 mg tablet, Take 50 mg by mouth daily (Patient not taking: Reported on 2/8/2023), Disp: , Rfl:     ALLERGIES:  Allergies   Allergen Reactions   • Codeine Anaphylaxis           REVIEW OF SYSTEMS:  Review of Systems   Constitutional: Negative for chills, fever and unexpected weight change  HENT: Negative for hearing loss, nosebleeds and sore throat  Eyes: Negative for pain, redness and visual disturbance  Respiratory: Negative for cough, shortness of breath and wheezing  Cardiovascular: Negative for chest pain, palpitations and leg swelling  Gastrointestinal: Negative for abdominal pain, nausea and vomiting  Endocrine: Negative for polydipsia and polyuria  Genitourinary: Negative for difficulty urinating and hematuria  Musculoskeletal: Negative for arthralgias, joint swelling and myalgias  Skin: Negative for rash and wound  Neurological: Negative for dizziness, numbness and headaches  Psychiatric/Behavioral: Negative for decreased concentration, dysphoric mood and suicidal ideas  The patient is not nervous/anxious  VITALS:  Vitals:    02/08/23 1212   BP: 107/70   Pulse: 71       LABS:  HgA1c: No results found for: HGBA1C  BMP:   Lab Results   Component Value Date    CALCIUM 9 8 02/03/2023     01/26/2017    K 5 2 02/03/2023    CO2 28 02/03/2023     02/03/2023    BUN 15 02/03/2023    CREATININE 0 83 02/03/2023       _____________________________________________________  PHYSICAL EXAMINATION:  General: well developed and well nourished, alert, oriented times 3 and appears comfortable  Psychiatric: Normal  HEENT: Normocephalic, Atraumatic Trachea Midline, No torticollis  Pulmonary: No audible wheezing or respiratory distress   Abdomen/GI: Non tender, non distended   Cardiovascular: No pitting edema, 2+ radial pulse   Skin: No masses, erythema, lacerations, fluctation, ulcerations  Neurovascular: Sensation Intact to the Median, Ulnar, Radial Nerve, Motor Intact to the Median, Ulnar, Radial Nerve and Pulses Intact  Musculoskeletal: Normal, except as noted in detailed exam and in HPI  MUSCULOSKELETAL EXAMINATION:    Left thumb:  Positive palpable nodule over the A1 pulley  Positive tenderness to palpation over A1 pulley  Positive catching  Positive clicking  Full composite fist  Brisk capillary refill  Right long finger:  Positive palpable nodule over the A1 pulley  Positive tenderness to palpation over A1 pulley  Negative catching  Positive clicking   Full composite fist  Brisk capillary refill      ___________________________________________________  STUDIES REVIEWED:  I have personally reviewed AP lateral and oblique radiographs of left thumb    which demonstrate no acute fx/ dislocation, mild CMC arthrosis            PROCEDURES PERFORMED:  Procedures  No Procedures performed today    _____________________________________________________      Leo Silver    I,:  Christopher Brandt am acting as a scribe while in the presence of the attending physician :       I,:  Dorothy Vargas MD personally performed the services described in this documentation    as scribed in my presence :

## 2023-02-10 ENCOUNTER — RA CDI HCC (OUTPATIENT)
Dept: OTHER | Facility: HOSPITAL | Age: 58
End: 2023-02-10

## 2023-02-17 ENCOUNTER — OFFICE VISIT (OUTPATIENT)
Dept: FAMILY MEDICINE CLINIC | Facility: CLINIC | Age: 58
End: 2023-02-17

## 2023-02-17 VITALS
OXYGEN SATURATION: 97 % | DIASTOLIC BLOOD PRESSURE: 80 MMHG | SYSTOLIC BLOOD PRESSURE: 118 MMHG | WEIGHT: 190.6 LBS | TEMPERATURE: 97.9 F | HEART RATE: 80 BPM | HEIGHT: 65 IN | BODY MASS INDEX: 31.75 KG/M2

## 2023-02-17 DIAGNOSIS — F41.9 ANXIETY: ICD-10-CM

## 2023-02-17 DIAGNOSIS — F33.0 MILD EPISODE OF RECURRENT MAJOR DEPRESSIVE DISORDER (HCC): Primary | ICD-10-CM

## 2023-02-17 RX ORDER — DIPHENOXYLATE HYDROCHLORIDE AND ATROPINE SULFATE 2.5; .025 MG/1; MG/1
1 TABLET ORAL DAILY
COMMUNITY

## 2023-02-17 RX ORDER — VITAMIN E 268 MG
400 CAPSULE ORAL DAILY
COMMUNITY

## 2023-02-17 RX ORDER — CHLORPHENIRAMINE/PHENYLPROPAN 8 MG-75 MG
1000 CAPSULE, EXTENDED RELEASE ORAL DAILY
COMMUNITY

## 2023-02-17 NOTE — PROGRESS NOTES
Name: Sanjuana De Oliveira      : 1965      MRN: [id number]  Encounter Provider: Rhianna Whitten MD  Encounter Date: 2023   Encounter department: [address]     1  Mild episode of recurrent major depressive disorder [facility])  Assessment & Plan:  I reviewed with pt  Mood sl labile related to stress  Will watch for now  Recheck 6m - earlier if worse      2  Anxiety  Assessment & Plan:  Sl labile secondary to stress  I reviewed with pt  Continue to monitor for now  Recheck 3-6m - earlier if worse             Subjective     f/u multiple med issues  - pt is changing careers and notes some persistent anxiety  Not sure if she feels depressed  She denies suicidal thought/plan  - pt has followed up with hand surgery  To have L thumb and R 3rd finger trigger releases  - pt denies new CV, resp, GI or  issues    Review of Systems   Constitutional: Positive for fatigue  Negative for activity change, appetite change, chills and fever  HENT: Negative  Eyes: Negative  Respiratory: Negative  Cardiovascular: Negative  Gastrointestinal: Negative  Genitourinary: Negative  Musculoskeletal: Negative  Skin: Negative  Neurological: Negative  Psychiatric/Behavioral: Negative for dysphoric mood and suicidal ideas  The patient is nervous/anxious  The patient is not hyperactive  Past Medical History:   Diagnosis Date   • Cervical cancer [facility])     age 27   • Lipoma     last assessed  Brijesh Rice 16   resolved    19       Past Surgical History:   Procedure Laterality Date   • CERVICAL CONIZATION   W/ LASER      by cold knife   •  SECTION      , ,      • SYNOVECTOMY      radical synovectomy of tendon sheath of palm or finger   trigger thumb release in     • THUMB FUSION     • TUBAL LIGATION Bilateral      Family History   Problem Relation Age of Onset   • Breast cancer Mother 59   • Diabetes Mother    • Ovarian cancer Mother 39   • Diabetes Father    • Hypertension Family    • Breast cancer Cousin 52   • No Known Problems Daughter    • No Known Problems Daughter    • No Known Problems Maternal Aunt    • No Known Problems Maternal Aunt    • No Known Problems Maternal Grandmother    • No Known Problems Maternal Grandfather    • No Known Problems Paternal Grandmother    • No Known Problems Paternal Grandfather      Social History     Socioeconomic History   • Marital status: /Civil Union     Spouse name: None   • Number of children: None   • Years of education: None   • Highest education level: None   Occupational History     Comment: full time employment   Tobacco Use   • Smoking status: Never     Passive exposure: Past   • Smokeless tobacco: Never   Substance and Sexual Activity   • Alcohol use: Yes     Comment: social    • Drug use: No   • Sexual activity: Yes   Other Topics Concern   • None   Social History Narrative    Almost always uses seat belts    Daily coffee consumption     Daily tea consumption     Dental care regularly    Exercise walking    Exercise yoga    Pet animals dog    Some college    Tea    Water intake adequate per day     Social Determinants of Health     Financial Resource Strain: Not on file   Food Insecurity: Not on file   Transportation Needs: Not on file   Physical Activity: Not on file   Stress: Not on file   Social Connections: Not on file   Intimate Partner Violence: Not on file   Housing Stability: Not on file     Current Outpatient Medications on File Prior to Visit   Medication Sig   • acyclovir (ZOVIRAX) 5 % ointment Apply topically every 3 (three) hours (6x a day) (Patient taking differently: Apply topically if needed)   • Ascorbic Acid (vitamin C) 100 MG tablet Take 100 mg by mouth if needed   • betamethasone dipropionate (DIPROSONE) 0 05 % cream Apply topically 2 (two) times a day (Patient taking differently: Apply topically if needed)   • Cholecalciferol (EQL Vitamin D3) 25 MCG (1000 UT) capsule Take 1,000 Units by mouth daily   • cyanocobalamin (VITAMIN B-12) 100 MCG tablet Take 100 mcg by mouth daily   • ibuprofen (MOTRIN) 600 mg tablet Take 200 mg by mouth if needed for mild pain or moderate pain   • multivitamin (THERAGRAN) TABS Take 1 tablet by mouth daily   • multivitamin (THERAGRAN) TABS Take 1 tablet by mouth daily   • vitamin E, tocopherol, 400 units capsule Take 400 Units by mouth daily   • zinc gluconate 50 mg tablet Take 50 mg by mouth daily     Allergies   Allergen Reactions   • Codeine Anaphylaxis     Immunization History   Administered Date(s) Administered   • COVID-19 PFIZER VACCINE 0 3 ML IM 04/16/2021, 08/26/2021       Objective     /80   Pulse 80   Temp 97 9 °F (36 6 °C)   Ht 5' 5" (1 651 m)   Wt 86 5 kg (190 lb 9 6 oz)   LMP 05/09/2021 (Approximate)   SpO2 97%   BMI 31 72 kg/m²     Physical Exam  Vitals reviewed  HENT:      Nose: Nose normal       Mouth/Throat:      Mouth: Mucous membranes are moist    Eyes:      Extraocular Movements: Extraocular movements intact  Conjunctiva/sclera: Conjunctivae normal       Pupils: Pupils are equal, round, and reactive to light  Cardiovascular:      Rate and Rhythm: Normal rate and regular rhythm  Pulses: Normal pulses  Abdominal:      General: There is no distension  Palpations: There is no mass  Tenderness: There is no abdominal tenderness  Musculoskeletal:         General: No tenderness or deformity  Cervical back: No tenderness  Right lower leg: No edema  Left lower leg: No edema  Lymphadenopathy:      Cervical: No cervical adenopathy  Skin:     General: Skin is warm  Capillary Refill: Capillary refill takes less than 2 seconds  Neurological:      General: No focal deficit present  Mental Status: She is alert and oriented to person, place, and time  Psychiatric:         Behavior: Behavior normal          Thought Content:  Thought content normal  Judgment: Judgment normal       Comments: PHQ-2/9 Depression Screening    Little interest or pleasure in doing things: 1 - several days  Feeling down, depressed, or hopeless: 1 - several days  Trouble falling or staying asleep, or sleeping too much: 2 - more than   half the days  Feeling tired or having little energy: 1 - several days  Poor appetite or overeatin - not at all  Feeling bad about yourself - or that you are a failure or have let   yourself or your family down: 0 - not at all  Trouble concentrating on things, such as reading the newspaper or watching   television: 1 - several days  Moving or speaking so slowly that other people could have noticed   Or the   opposite - being so fidgety or restless that you have been moving around a   lot more than usual: 0 - not at all  Thoughts that you would be better off dead, or of hurting yourself in some   way: 0 - not at all  PHQ-9 Score: 6   PHQ-9 Interpretation: Mild depression             Miriam Gentile MD

## 2023-02-19 PROBLEM — F33.1 MODERATE EPISODE OF RECURRENT MAJOR DEPRESSIVE DISORDER (HCC): Status: RESOLVED | Noted: 2022-12-29 | Resolved: 2023-02-19

## 2023-02-20 NOTE — ASSESSMENT & PLAN NOTE
I reviewed with pt  Mood sl labile related to stress  Will watch for now   Recheck 6m - earlier if worse

## 2023-02-20 NOTE — ASSESSMENT & PLAN NOTE
Sl labile secondary to stress  I reviewed with pt  Continue to monitor for now   Recheck 3-6m - earlier if worse

## 2023-02-24 ENCOUNTER — TELEPHONE (OUTPATIENT)
Dept: OBGYN CLINIC | Facility: CLINIC | Age: 58
End: 2023-02-24

## 2023-03-08 ENCOUNTER — TELEPHONE (OUTPATIENT)
Dept: OBGYN CLINIC | Facility: CLINIC | Age: 58
End: 2023-03-08

## 2023-03-09 ENCOUNTER — TELEPHONE (OUTPATIENT)
Dept: OBGYN CLINIC | Facility: CLINIC | Age: 58
End: 2023-03-09

## 2023-03-10 ENCOUNTER — TELEPHONE (OUTPATIENT)
Dept: OBGYN CLINIC | Facility: CLINIC | Age: 58
End: 2023-03-10

## 2023-05-04 ENCOUNTER — OFFICE VISIT (OUTPATIENT)
Dept: LAB | Facility: CLINIC | Age: 58
End: 2023-05-04

## 2023-05-04 ENCOUNTER — APPOINTMENT (OUTPATIENT)
Dept: LAB | Facility: CLINIC | Age: 58
End: 2023-05-04

## 2023-05-04 ENCOUNTER — HOSPITAL ENCOUNTER (OUTPATIENT)
Dept: RADIOLOGY | Age: 58
Discharge: HOME | End: 2023-05-04

## 2023-05-04 VITALS — WEIGHT: 190 LBS | BODY MASS INDEX: 31.65 KG/M2 | HEIGHT: 65 IN

## 2023-05-04 DIAGNOSIS — M79.645 CHRONIC PAIN OF LEFT THUMB: ICD-10-CM

## 2023-05-04 DIAGNOSIS — G89.29 CHRONIC PAIN OF LEFT THUMB: ICD-10-CM

## 2023-05-04 DIAGNOSIS — M65.312 TRIGGER THUMB, LEFT THUMB: ICD-10-CM

## 2023-05-04 DIAGNOSIS — Z01.812 PRE-PROCEDURE LAB EXAM: ICD-10-CM

## 2023-05-04 DIAGNOSIS — M65.331 TRIGGER MIDDLE FINGER OF RIGHT HAND: ICD-10-CM

## 2023-05-04 DIAGNOSIS — Z12.31 SCREENING MAMMOGRAM FOR BREAST CANCER: ICD-10-CM

## 2023-05-04 LAB
ANION GAP SERPL CALCULATED.3IONS-SCNC: 6 MMOL/L (ref 4–13)
ATRIAL RATE: 84 BPM
BUN SERPL-MCNC: 14 MG/DL (ref 5–25)
CALCIUM SERPL-MCNC: 9.9 MG/DL (ref 8.4–10.2)
CHLORIDE SERPL-SCNC: 104 MMOL/L (ref 96–108)
CO2 SERPL-SCNC: 29 MMOL/L (ref 21–32)
CREAT SERPL-MCNC: 0.78 MG/DL (ref 0.6–1.3)
GFR SERPL CREATININE-BSD FRML MDRD: 85 ML/MIN/1.73SQ M
GLUCOSE P FAST SERPL-MCNC: 84 MG/DL (ref 65–99)
P AXIS: 56 DEGREES
POTASSIUM SERPL-SCNC: 3.6 MMOL/L (ref 3.5–5.3)
PR INTERVAL: 146 MS
QRS AXIS: 15 DEGREES
QRSD INTERVAL: 76 MS
QT INTERVAL: 374 MS
QTC INTERVAL: 441 MS
SODIUM SERPL-SCNC: 139 MMOL/L (ref 135–147)
T WAVE AXIS: 20 DEGREES
VENTRICULAR RATE: 84 BPM

## 2023-05-16 ENCOUNTER — ANESTHESIA EVENT (OUTPATIENT)
Dept: PERIOP | Facility: AMBULARY SURGERY CENTER | Age: 58
End: 2023-05-16
Payer: COMMERCIAL

## 2023-05-23 NOTE — PRE-PROCEDURE INSTRUCTIONS
Pre-Surgery Instructions:   Medication Instructions   • multivitamin (THERAGRAN) TABS Stop taking 7 days prior to surgery  • vitamin E, tocopherol, 400 units capsule Stop taking 7 days prior to surgery  Medication instructions for day surgery reviewed  Please use only a sip of water to take your instructed medications  Avoid all over the counter vitamins, supplements and NSAIDS for one week prior to surgery per anesthesia guidelines  Tylenol is ok to take as needed  You will receive a call one business day prior to surgery with an arrival time and hospital directions  If your surgery is scheduled on a Monday, the hospital will be calling you on the Friday prior to your surgery  If you have not heard from anyone by 8pm, please call the hospital supervisor through the hospital  at [phone]  Mohsen Andersen [phone])  Do not eat or drink anything after midnight the night before your surgery, including candy, mints, lifesavers, or chewing gum  Do not drink alcohol 24hrs before your surgery  Try not to smoke at least 24hrs before your surgery  Follow the pre surgery showering instructions as listed in the [facility] Surgical Experience Booklet” or otherwise provided by your surgeon's office  Do not shave the surgical area 24 hours before surgery  Do not apply any lotions, creams, including makeup, cologne, deodorant, or perfumes after showering on the day of your surgery  No contact lenses, eye make-up, or artificial eyelashes  Remove nail polish, including gel polish, and any artificial, gel, or acrylic nails if possible  Remove all jewelry including rings and body piercing jewelry  Wear causal clothing that is easy to take on and off  Consider your type of surgery  Keep any valuables, jewelry, piercings at home  Please bring any specially ordered equipment (sling, braces) if indicated  Arrange for a responsible person to drive you to and from the hospital on the day of your surgery  Visitor Guidelines discussed  Call the surgeon's office with any new illnesses, exposures, or additional questions prior to surgery  Please reference your [facility] Surgical Experience Booklet” for additional information to prepare for your upcoming surgery

## 2023-05-30 ENCOUNTER — ANESTHESIA (OUTPATIENT)
Dept: PERIOP | Facility: AMBULARY SURGERY CENTER | Age: 58
End: 2023-05-30
Payer: COMMERCIAL

## 2023-05-30 ENCOUNTER — HOSPITAL ENCOUNTER (OUTPATIENT)
Facility: AMBULARY SURGERY CENTER | Age: 58
Setting detail: OUTPATIENT SURGERY
Discharge: HOME | End: 2023-05-30
Attending: SURGERY | Admitting: SURGERY

## 2023-05-30 VITALS
OXYGEN SATURATION: 100 % | HEART RATE: 62 BPM | BODY MASS INDEX: 30.99 KG/M2 | HEIGHT: 65 IN | SYSTOLIC BLOOD PRESSURE: 111 MMHG | TEMPERATURE: 97.1 F | RESPIRATION RATE: 18 BRPM | WEIGHT: 186 LBS | DIASTOLIC BLOOD PRESSURE: 57 MMHG

## 2023-05-30 DIAGNOSIS — Z47.89 AFTERCARE FOLLOWING SURGERY OF THE MUSCULOSKELETAL SYSTEM: ICD-10-CM

## 2023-05-30 DIAGNOSIS — M65.312 TRIGGER THUMB, LEFT THUMB: Primary | ICD-10-CM

## 2023-05-30 RX ORDER — MEPERIDINE HYDROCHLORIDE 25 MG/ML
12.5 INJECTION INTRAMUSCULAR; INTRAVENOUS; SUBCUTANEOUS
Status: DISCONTINUED | OUTPATIENT
Start: 2023-05-30 | End: 2023-05-30 | Stop reason: HOSPADM

## 2023-05-30 RX ORDER — HYDROCODONE BITARTRATE AND ACETAMINOPHEN 5; 325 MG/1; MG/1
1 TABLET ORAL EVERY 6 HOURS PRN
Status: DISCONTINUED | OUTPATIENT
Start: 2023-05-30 | End: 2023-05-30 | Stop reason: HOSPADM

## 2023-05-30 RX ORDER — FENTANYL CITRATE/PF 50 MCG/ML
50 SYRINGE (ML) INJECTION
Status: DISCONTINUED | OUTPATIENT
Start: 2023-05-30 | End: 2023-05-30 | Stop reason: HOSPADM

## 2023-05-30 RX ORDER — FENTANYL CITRATE 50 UG/ML
INJECTION, SOLUTION INTRAMUSCULAR; INTRAVENOUS AS NEEDED
Status: DISCONTINUED | OUTPATIENT
Start: 2023-05-30 | End: 2023-05-30

## 2023-05-30 RX ORDER — MAGNESIUM HYDROXIDE 1200 MG/15ML
LIQUID ORAL AS NEEDED
Status: DISCONTINUED | OUTPATIENT
Start: 2023-05-30 | End: 2023-05-30 | Stop reason: HOSPADM

## 2023-05-30 RX ORDER — MIDAZOLAM HYDROCHLORIDE 2 MG/2ML
INJECTION, SOLUTION INTRAMUSCULAR; INTRAVENOUS AS NEEDED
Status: DISCONTINUED | OUTPATIENT
Start: 2023-05-30 | End: 2023-05-30

## 2023-05-30 RX ORDER — CEFAZOLIN SODIUM 2 G/50ML
2000 SOLUTION INTRAVENOUS ONCE
Status: COMPLETED | OUTPATIENT
Start: 2023-05-30 | End: 2023-05-30

## 2023-05-30 RX ORDER — DEXAMETHASONE SODIUM PHOSPHATE 10 MG/ML
INJECTION, SOLUTION INTRAMUSCULAR; INTRAVENOUS AS NEEDED
Status: DISCONTINUED | OUTPATIENT
Start: 2023-05-30 | End: 2023-05-30

## 2023-05-30 RX ORDER — ONDANSETRON 2 MG/ML
INJECTION INTRAMUSCULAR; INTRAVENOUS AS NEEDED
Status: DISCONTINUED | OUTPATIENT
Start: 2023-05-30 | End: 2023-05-30

## 2023-05-30 RX ORDER — OXYCODONE HYDROCHLORIDE AND ACETAMINOPHEN 5; 325 MG/1; MG/1
1 TABLET ORAL EVERY 4 HOURS PRN
Qty: 5 TABLET | Refills: 0 | Status: SHIPPED | OUTPATIENT
Start: 2023-05-30 | End: 2023-06-09

## 2023-05-30 RX ORDER — PROPOFOL 10 MG/ML
INJECTION, EMULSION INTRAVENOUS CONTINUOUS PRN
Status: DISCONTINUED | OUTPATIENT
Start: 2023-05-30 | End: 2023-05-30

## 2023-05-30 RX ORDER — SODIUM CHLORIDE, SODIUM LACTATE, POTASSIUM CHLORIDE, CALCIUM CHLORIDE 600; 310; 30; 20 MG/100ML; MG/100ML; MG/100ML; MG/100ML
INJECTION, SOLUTION INTRAVENOUS CONTINUOUS PRN
Status: DISCONTINUED | OUTPATIENT
Start: 2023-05-30 | End: 2023-05-30

## 2023-05-30 RX ORDER — CHLORHEXIDINE GLUCONATE 0.12 MG/ML
15 RINSE ORAL ONCE
Status: DISCONTINUED | OUTPATIENT
Start: 2023-05-30 | End: 2023-05-30 | Stop reason: HOSPADM

## 2023-05-30 RX ORDER — PROPOFOL 10 MG/ML
INJECTION, EMULSION INTRAVENOUS AS NEEDED
Status: DISCONTINUED | OUTPATIENT
Start: 2023-05-30 | End: 2023-05-30

## 2023-05-30 RX ORDER — LIDOCAINE HYDROCHLORIDE 10 MG/ML
INJECTION, SOLUTION EPIDURAL; INFILTRATION; INTRACAUDAL; PERINEURAL AS NEEDED
Status: DISCONTINUED | OUTPATIENT
Start: 2023-05-30 | End: 2023-05-30

## 2023-05-30 RX ADMIN — PROPOFOL 60 MG: 10 INJECTION, EMULSION INTRAVENOUS at 08:06

## 2023-05-30 RX ADMIN — MIDAZOLAM HYDROCHLORIDE 2 MG: 1 INJECTION, SOLUTION INTRAMUSCULAR; INTRAVENOUS at 08:01

## 2023-05-30 RX ADMIN — PROPOFOL 80 MCG/KG/MIN: 10 INJECTION, EMULSION INTRAVENOUS at 08:08

## 2023-05-30 RX ADMIN — LIDOCAINE HYDROCHLORIDE 50 MG: 10 INJECTION, SOLUTION EPIDURAL; INFILTRATION; INTRACAUDAL at 08:06

## 2023-05-30 RX ADMIN — SODIUM CHLORIDE, SODIUM LACTATE, POTASSIUM CHLORIDE, AND CALCIUM CHLORIDE: .6; .31; .03; .02 INJECTION, SOLUTION INTRAVENOUS at 08:02

## 2023-05-30 RX ADMIN — CEFAZOLIN SODIUM 2000 MG: 2 SOLUTION INTRAVENOUS at 07:56

## 2023-05-30 RX ADMIN — FENTANYL CITRATE 25 MCG: 50 INJECTION, SOLUTION INTRAMUSCULAR; INTRAVENOUS at 08:17

## 2023-05-30 RX ADMIN — FENTANYL CITRATE 25 MCG: 50 INJECTION, SOLUTION INTRAMUSCULAR; INTRAVENOUS at 08:15

## 2023-05-30 RX ADMIN — FENTANYL CITRATE 50 MCG: 50 INJECTION, SOLUTION INTRAMUSCULAR; INTRAVENOUS at 08:06

## 2023-05-30 RX ADMIN — DEXAMETHASONE SODIUM PHOSPHATE 10 MG: 10 INJECTION, SOLUTION INTRAMUSCULAR; INTRAVENOUS at 08:08

## 2023-05-30 RX ADMIN — ONDANSETRON 4 MG: 2 INJECTION INTRAMUSCULAR; INTRAVENOUS at 08:08

## 2023-05-30 NOTE — DISCHARGE INSTR - AVS FIRST PAGE
Post Operative Instructions    You have had surgery on your arm today, please read and follow the information below:  Elevate your hand above your elbow during the next 24-48 hours to help with swelling  Place your hand and arm over your head with motion at your shoulder three times a day  Do not apply any cream/ointment/oil to your incisions including antibiotics  Do not soak your hands in standing water (dishwater, tubs, Jacuzzi's, pools, etc ) until given permission (typically 2-3 weeks after injury)    Call the office if you notice any:  Increased numbness or tingling of your hand or fingers that is not relieved with elevation  Increasing pain that is not controlled with medication  Difficulty chewing, breathing, swallowing  Chest pains or shortness of breath  Fever over 101 4 degrees  Bandage: Please keep bandages clean and dry  Remove bandage after 5 days  Once bandages are removed you may wash hands with soap and water  Short showers are okay as well, but please avoid soaking the hand as described above (ie no pools, baths, dirty dish water, hot tubs, ocean/lake water, etc)  Sutures will be removed in the office at your first follow up visit, please do not remove them yourself  Please do NOT put any topical agents on the surgical wound including neosporin, peroxide, tea tree oil, vitamin E, etc  as these can delay wound healing  Motion: Move fingers into a fist 5 times a day, DO NOT move any splinted fingers  Weight bearing status: Avoid heavy lifting (>5 pounds) with the extremity that was operated on until follow up appointment  Normal activities of daily living are OK  Ice: Ice for 10 minutes every hour as needed for swelling x 24 hours  Sling: No sling necessary      Pain medication:   Naproxen 220 mg two times a day (this is over the counter!)  *do not take this medication if you were told by your doctor that you cannot take anti-inflammatories or NSAIDS  Tylenol Extended Release 650 mg every 8 hours (this is over the counter!)   Norco/Percocet one tab every 6 hours ONLY AS NEEDED for severe pain        Follow-up Appointment: 10-14 days with Dr Kelvin Thornton        Please call the office if you have any questions or concerns regarding your post-operative care

## 2023-05-30 NOTE — H&P
ASSESSMENT/PLAN:       56 y o  female with right long and left thumb trigger fingers      The etiology of above diagnosis was discussed along with treatment options  Trigger finger CSI's vs trigger finger release was discussed at length including risks and benefits  Left trigger thumb release was discussed at length including risks and benefits  Risks of benefits consist of but not limited to numb patch about thumb, bleeding, infection, stiffness, pain, injury to surrounding structures, reoccurrence, need for further surgery, etc  Bari Guillen elected to proceed with a left trigger thumb release and informed surgical consent was signed  Post operative instructions/expectations were reviewed and provided in AVS  Lab work and EKG will be obtained prior to surgical intervention  Follow up in the office 10-14 days following surgery for suture removal         The patient verbalized understanding of exam findings and treatment plan  We engaged in the shared decision-making process and treatment options were discussed at length with the patient  Surgical and conservative management discussed today along with risks and benefits      Diagnoses and all orders for this visit:     Trigger thumb, left thumb  -     Ambulatory Referral to Hand Surgery     Trigger middle finger of right hand  -     Ambulatory Referral to Hand Surgery     Chronic pain of left thumb  -     Ambulatory Referral to Hand Surgery        Follow Up:  Return for 10-14 days , post op      To Do Next Visit:  Re-evaluation of current issue and Sutures out        General Discussions:  Trigger Finger: The anatomy and physiology of trigger finger was discussed with the patient today in the office  Edema and increased contact pressure within the flexor tendons at the A1 pulley can cause pain, crepitation, and triggering or locking of the digit resulting in limitation of function    Symptoms can occur at anytime but are typically worse in the morning or after a brief rest from repetitive activity  Treatment options include resting/nighttime MP blocking splints to decrease edema, oral anti-inflammatory medications, home or formal therapy exercises, up to 2 steroid injections within the tendon sheath, or surgical release  While majority of patients do respond to conservative treatment, up to 20% may require surgical release       Operative Discussions:  Trigger Finger Release: The anatomy and physiology of trigger finger was discussed with the patient today in the office  Edema and increased contact pressure within the flexor tendons at the A1 pulley can cause pain, crepitation, and limitation of function  Treatment options include resting MP blocking splints to decrease edema, oral anti-inflammatory medications, home or formal therapy exercises, up to 2 steroid injections or surgical release  While majority of patients do respond to conservative treatment, up to 20% may require surgical release  The patient has elected release of the trigger finger  The patient has elected to undergo a release of the A1 pulley (trigger finger)  A small incision will be made over the palmar aspect of the hand, the tendon sheath holding the flexor tendons will be released  In the postoperative period, light activities are allowed immediately, driving is allowed when narcotic medication has stopped, and the incision may get wet after 2 days  Heavy activities (lifting more than approximately 10 pounds) will be allowed after the follow up appointment in 1-2 weeks  While the pain and discomfort within the wrist typically improves rapidly, some residual discomfort may be present for up to 6 weeks  The nodule that is typically palpable in the palmar aspect of the hand will not be removed, as this would necessitate removal of a portion of the flexor tendon, however the catching, clicking, and locking should resolve  Approximate success rate is 98%  The risks and benefits of the procedure were explained to the patient, which include, but are not limited to: Bleeding, infection, recurrence, pain, scar, damage to tendons, damage to nerves, and damage to blood vessels, need for future surgery and complications related to anesthesia  If bony work is done, risks also include malunion and nonunion  These risks, along with alternative conservative treatment options, and postoperative protocols were voiced back and understood by the patient  All questions were answered to the patient's satisfaction  The patient agrees to comply with a standard postoperative protocol, and is willing to proceed  Education was provided via written and auditory forms  There were no barriers to learning  Written handouts regarding wound care, incision and scar care, and general preoperative information, as well as risks and benefits were provided to the patient         ____________________________________________________________________________________________________________________________________________        CHIEF COMPLAINT:      Chief Complaint   Patient presents with   • Right Middle Finger - Locking, Pain   • Left Thumb - Pain, Locking         SUBJECTIVE:  Humza Gannon is a 56y o  year old RHD female who presents to the office for evaluation of her left thumb and right long finger  She notes her left thumb and right long finger has been clicking, catching and locking for a few months  She feels her symptoms are worsening  Left thumb is currently worse then her right long finger  She does have to use the other hand to unlock the fingers when they locking occurs  She notes pain to the A1 pulleys of the left thumb and right long finger  She has a history of a right thumb trigger finger release around [id number]  She does not remember trying trigger finger CSI's for her right thumb at that time         Pain/symptom timing:  Worse during the day when active  Pain/symptom context:  Worse with activites and work  Pain/symptom modifying factors:  Rest makes better, activities make worse  Pain/symptom associated signs/symptoms: none     Prior treatment   • NSAIDsNo   • Injections No   • Bracing/Orthotics No    Physical Therapy No      I have personally reviewed all the relevant PMH, PSH, SH, FH, Medications and allergies        PAST MEDICAL HISTORY:       Past Medical History:   Diagnosis Date   • Cervical cancer ([facility] [address] )       age 27   • Lipoma       last assessed  [facility] 16   resolved    19           PAST SURGICAL HISTORY:        Past Surgical History:   Procedure Laterality Date   • CERVICAL CONIZATION   W/ LASER         by cold knife   •  SECTION         , ,      • SYNOVECTOMY         radical synovectomy of tendon sheath of palm or finger   trigger thumb release in     • THUMB FUSION       • TUBAL LIGATION Bilateral           FAMILY HISTORY:        Family History   Problem Relation Age of Onset   • Breast cancer Mother 59   • Diabetes Mother     • Ovarian cancer Mother 39   • Diabetes Father     • Hypertension Family     • Breast cancer Cousin 52   • No Known Problems Daughter     • No Known Problems Daughter     • No Known Problems Maternal Aunt     • No Known Problems Maternal Aunt     • No Known Problems Maternal Grandmother     • No Known Problems Maternal Grandfather     • No Known Problems Paternal Grandmother     • No Known Problems Paternal Grandfather           SOCIAL HISTORY:  Social History            Tobacco Use   • Smoking status: Never   • Smokeless tobacco: Never   Substance Use Topics   • Alcohol use: Yes       Comment: social    • Drug use:  No         MEDICATIONS:     Current Outpatient Medications:   •  acyclovir (ZOVIRAX) 5 % ointment, Apply topically every 3 (three) hours (6x a day) (Patient taking differently: Apply topically if needed), Disp: 15 g, Rfl: 5  •  Ascorbic Acid (vitamin C) 100 MG tablet, Take 100 mg by mouth if needed, Disp: , Rfl:   •  betamethasone dipropionate (DIPROSONE) 0 05 % cream, Apply topically 2 (two) times a day (Patient taking differently: Apply topically if needed), Disp: 30 g, Rfl: 0  •  cyanocobalamin (VITAMIN B-12) 100 MCG tablet, Take 100 mcg by mouth daily, Disp: , Rfl:   •  ibuprofen (MOTRIN) 600 mg tablet, Take 200 mg by mouth if needed for mild pain or moderate pain, Disp: , Rfl:   •  multivitamin (THERAGRAN) TABS, Take 1 tablet by mouth daily, Disp: , Rfl:   •  zinc gluconate 50 mg tablet, Take 50 mg by mouth daily (Patient not taking: Reported on 2/8/2023), Disp: , Rfl:      ALLERGIES:       Allergies   Allergen Reactions   • Codeine Anaphylaxis               REVIEW OF SYSTEMS:  Review of Systems   Constitutional: Negative for chills, fever and unexpected weight change  HENT: Negative for hearing loss, nosebleeds and sore throat  Eyes: Negative for pain, redness and visual disturbance  Respiratory: Negative for cough, shortness of breath and wheezing  Cardiovascular: Negative for chest pain, palpitations and leg swelling  Gastrointestinal: Negative for abdominal pain, nausea and vomiting  Endocrine: Negative for polydipsia and polyuria  Genitourinary: Negative for difficulty urinating and hematuria  Musculoskeletal: Negative for arthralgias, joint swelling and myalgias  Skin: Negative for rash and wound  Neurological: Negative for dizziness, numbness and headaches  Psychiatric/Behavioral: Negative for decreased concentration, dysphoric mood and suicidal ideas   The patient is not nervous/anxious           VITALS:      Vitals:     02/08/23 1212   BP: 107/70   Pulse: 71         LABS:  HgA1c: No results found for: HGBA1C  BMP:         Lab Results   Component Value Date     CALCIUM 9 8 02/03/2023      01/26/2017     K 5 2 02/03/2023     CO2 28 02/03/2023      02/03/2023     BUN 15 02/03/2023     CREATININE 0 83 02/03/2023         _____________________________________________________  PHYSICAL EXAMINATION:  General: well developed and well nourished, alert, oriented times 3 and appears comfortable  Psychiatric: Normal  HEENT: Normocephalic, Atraumatic Trachea Midline, No torticollis  Pulmonary: No audible wheezing or respiratory distress   Abdomen/GI: Non tender, non distended   Cardiovascular: No pitting edema, 2+ radial pulse   Skin: No masses, erythema, lacerations, fluctation, ulcerations  Neurovascular: Sensation Intact to the Median, Ulnar, Radial Nerve, Motor Intact to the Median, Ulnar, Radial Nerve and Pulses Intact  Musculoskeletal: Normal, except as noted in detailed exam and in HPI         MUSCULOSKELETAL EXAMINATION:     Left thumb:  Positive palpable nodule over the A1 pulley  Positive tenderness to palpation over A1 pulley  Positive catching  Positive clicking  Full composite fist  Brisk capillary refill         Right long finger:  Positive palpable nodule over the A1 pulley  Positive tenderness to palpation over A1 pulley  Negative catching  Positive clicking   Full composite fist  Brisk capillary refill       ___________________________________________________  STUDIES REVIEWED:  I have personally reviewed AP lateral and oblique radiographs of left thumb    which demonstrate no acute fx/ dislocation, mild CMC arthrosis

## 2023-05-30 NOTE — ANESTHESIA POSTPROCEDURE EVALUATION
Post-Op Assessment Note    CV Status:  Stable  Pain Score: 0    Pain management: adequate     Mental Status:  Alert and awake   Hydration Status:  Euvolemic   PONV Controlled:  Controlled   Airway Patency:  Patent      Post Op Vitals Reviewed: Yes      Staff: Anesthesiologist, CRNA         There were no known notable events for this encounter      BP      Temp     Pulse     Resp      SpO2

## 2023-05-30 NOTE — OP NOTE
OPERATIVE REPORT  PATIENT NAME: Bronwyn Musa  :  1965  MRN: [id number]  Pt Location: [facility] MAIN OR    SURGERY DATE: 23    Surgeon(s) and Role:     * Collin Mckeon MD - Primary     * Tory Parikh PA-C - Assisting    Pre-Op Diagnosis:  Trigger thumb, left thumb [M65 312]    Post-Op Diagnosis Codes:     * Trigger thumb, left thumb [M65 312]    Procedure(s):  THUMB TRIGGER FINGER RELEASE (Left)    Specimen(s):  No specimens collected during this procedure  Estimated Blood Loss:   Minimal    Anesthesia Type:   Conscious Sedation     IMPLANTS:  * No implants in log *    PERIOPERATIVE ANTIBIOTICS:    cefazolin, 2 grams    Tourniquet Time: 3 min  at 250 mmHg          Operative Indications: The patient has a history of Trigger Finger  left  thumb that was recalcitrant to conservative management  The decision was made to bring the patient to the operating room for Trigger Finger Release  left  thumb  Risks of the procedure were explained which include, but are not limited to bleeding; infection; damage to nerves, arteries,veins, tendons; scar; pain; need for reoperation; failure to give desired result; and risks of anaesthesia  All questions were answered to satisfaction and they were willing to proceed  Operative Findings:  Hypertrophy A1 pulley    Complications:   None    Procedure and Technique:  After the patient, site, and procedure were identified, the patient was brought into the operating room in a supine position  Local anaesthesia and sedation were provided  A well padded tourniquet was applied to the extremity, set at 250 mmHg  The  left upper extremity was then prepped and drapped in a normal, sterile, orthopedic fashion  A  Horizontal incision is made in line with the proximal crease of the left thumb, overlying the A1 pulley Dissection was  carried down under loupe magnification  Skin and subcutaneous tissues were sharply incised   The ulnar digital nerve was identified and protected The tissue was elevated off the A1 pulley  The A1 pulley was  identified and incised  There was no retinacular cyst noted  Tenosynovectomy was not carried out  The FPL was noted to have  scuffing at the volar surface       At the completion of the procedure, hemostasis was obtained with cautery and direct pressure  The wounds were copiously irrigated with sterile solution  The wounds were closed with Prolene  Sterile dressings were applied, including Xeroform, gauze, tweeners, webril, ACE  Please note, all sponge, needle, and instrument counts were correct prior to closure  Loupe magnification was utilized  The patient tolerated the procedure well  I was present for the entire procedure , A qualified resident physician was not available  and A physician assistant was required during the procedure for retraction, tissue handling, dissection and suturing      Patient Disposition:  PACU     SIGNATURE: Rivera Merrill MD  DATE: 05/30/23  TIME: 8:24 AM

## 2023-05-30 NOTE — ANESTHESIA PREPROCEDURE EVALUATION
Procedure:  THUMB TRIGGER FINGER RELEASE (Left: Hand)    Relevant Problems   CARDIO   (+) Hyperlipidemia      GI/HEPATIC   (+) Esophageal reflux      MUSCULOSKELETAL   (+) Disc degeneration, lumbar   (+) Mid back pain   (+) Spondylosis of lumbar region without myelopathy or radiculopathy      NEURO/PSYCH   (+) Anxiety   (+) Chronic pain of left thumb   (+) Mild episode of recurrent major depressive disorder (HCC)        Physical Exam    Airway    Mallampati score: II  TM Distance: >3 FB  Neck ROM: full     Dental   No notable dental hx     Cardiovascular      Pulmonary      Other Findings        Anesthesia Plan  ASA Score- 2     Anesthesia Type- IV sedation with anesthesia with ASA Monitors  Additional Monitors:   Airway Plan:           Plan Factors-Exercise tolerance (METS): >4 METS  Chart reviewed  Existing labs reviewed  Patient summary reviewed  Induction- intravenous  Postoperative Plan-     Informed Consent- Anesthetic plan and risks discussed with patient  I personally reviewed this patient with the CRNA  Discussed and agreed on the Anesthesia Plan with the CRNA  Federico Soto

## 2023-06-14 ENCOUNTER — OFFICE VISIT (OUTPATIENT)
Dept: OBGYN CLINIC | Facility: CLINIC | Age: 58
End: 2023-06-14

## 2023-06-14 VITALS
HEIGHT: 65 IN | HEART RATE: 67 BPM | BODY MASS INDEX: 30.95 KG/M2 | SYSTOLIC BLOOD PRESSURE: 113 MMHG | DIASTOLIC BLOOD PRESSURE: 78 MMHG

## 2023-06-14 DIAGNOSIS — M65.331 TRIGGER FINGER, RIGHT MIDDLE FINGER: Primary | ICD-10-CM

## 2023-06-14 DIAGNOSIS — Z98.890 S/P TRIGGER FINGER RELEASE: ICD-10-CM

## 2023-06-14 PROCEDURE — 99024 POSTOP FOLLOW-UP VISIT: CPT | Performed by: SURGERY

## 2023-06-14 RX ORDER — CHLORHEXIDINE GLUCONATE 0.12 MG/ML
15 RINSE ORAL ONCE
OUTPATIENT
Start: 2023-06-14 | End: 2023-06-14

## 2023-06-14 NOTE — PROGRESS NOTES
ASSESSMENT/PLAN:      55 yo female 2 weeks s/p left trigger thumb release, doing well  Also with a right long trigger finger    Sutures removed from the left thumb today  Patient may begin scar massage and resume activities as tolerated  New issue of right long finger trigger finger was discussed  Patient would like to avoid injections and proceed directly to surgical release  Risk benefits and alternatives to surgery were discussed and informed consent was signed today for right long trigger finger release under local anesthesia  Postoperative protocol expectations were reviewed and all questions answered to the best of our ability  We will see her 10 to 14 days following surgery for postop evaluation and suture removal    The patient verbalized understanding of exam findings and treatment plan  We engaged in the shared decision-making process and treatment options were discussed at length with the patient  Surgical and conservative management discussed today along with risks and benefits  Diagnoses and all orders for this visit:    Trigger finger, right middle finger  -     Case request operating room: [address] [place] [address]; Standing  -     Case request operating room: RELEASE TRIGGER FINGER    S/P trigger finger release    Other orders  -     Nursing Communication [address] [place] [address] Interventions Implemented; Standing  -     Nursing Communication CHG bath, have staff wash entire body (neck down) per pre-op bathing protocol  Routine, evening prior to, and day of surgery ; Standing  -     Nursing Communication Swab both nares with Povidone-Iodine solution, EXCLUDE if patient has shellfish/Iodine allergy, and replace with nasal alcohol swabstick  Routine, day of surgery, on call to OR; Standing  -     chlorhexidine (PERIDEX) 0 12 % oral rinse 15 mL  -     Void on call to OR; Standing      Trigger Finger Release: The anatomy and physiology of trigger finger was discussed with the patient today in the office    Edema and increased contact pressure within the flexor tendons at the A1 pulley can cause pain, crepitation, and limitation of function  Treatment options include resting MP blocking splints to decrease edema, oral anti-inflammatory medications, home or formal therapy exercises, up to 2 steroid injections or surgical release  While majority of patients do respond to conservative treatment, up to 20% may require surgical release  The patient has elected release of the trigger finger  The patient has elected to undergo a release of the A1 pulley (trigger finger)  A small incision will be made over the palmar aspect of the hand, the tendon sheath holding the flexor tendons will be released  In the postoperative period, light activities are allowed immediately, driving is allowed when narcotic medication has stopped, and the incision may get wet after 2 days  Heavy activities (lifting more than approximately 10 pounds) will be allowed after the follow up appointment in 1-2 weeks  While the pain and discomfort within the wrist typically improves rapidly, some residual discomfort may be present for up to 6 weeks  The nodule that is typically palpable in the palmar aspect of the hand will not be removed, as this would necessitate removal of a portion of the flexor tendon, however the catching, clicking, and locking should resolve  Approximate success rate is 98%  The risks and benefits of the procedure were explained to the patient, which include, but are not limited to: Bleeding, infection, recurrence, pain, scar, damage to tendons, damage to nerves, and damage to blood vessels, need for future surgery and complications related to anesthesia  If bony work is done, risks also include malunion and nonunion  These risks, along with alternative conservative treatment options, and postoperative protocols were voiced back and understood by the patient  All questions were answered to the patient's satisfaction    The patient agrees to comply with a standard postoperative protocol, and is willing to proceed  Education was provided via written and auditory forms  There were no barriers to learning  Written handouts regarding wound care, incision and scar care, and general preoperative information, as well as risks and benefits were provided to the patient  Follow Up:  No follow-ups on file  To Do Next Visit:  Re-evaluation of current issue, sutures out     ____________________________________________________________________________________________________________________________________________      CHIEF COMPLAINT:  Chief Complaint   Patient presents with   • Left Hand - Post-op     Trigger thumb, left thumb       SUBJECTIVE:  Elaina Mchugh is a 56y o  year old RHD female who presents for initial postoperative evaluation status post left trigger thumb release  Patient is overall doing very well  She denies any residual pain or any clicking or locking  No fevers or chills  She does note clicking and locking of the right long finger which has been ongoing  She would like to discuss surgical release of the finger  No prior injections        I have personally reviewed all the relevant PMH, PSH, SH, FH, Medications and allergies  PAST MEDICAL HISTORY:  Past Medical History:   Diagnosis Date   • Cervical cancer [facility])     age 27   • Lipoma     last assessed  Nancy Blackmon 16   resolved    19     • Vertigo        PAST SURGICAL HISTORY:  Past Surgical History:   Procedure Laterality Date   • CERVICAL CONIZATION   W/ LASER      by cold knife   •  SECTION      , ,      • [place] TENDON SHEATH INCISION Left 2023    Procedure: THUMB TRIGGER FINGER RELEASE;  Surgeon: Toño Ziegler MD;  Location: [facility] MAIN [facility];  Service: Orthopedics   • SYNOVECTOMY      radical synovectomy of tendon sheath of palm or finger   trigger thumb release in     • THUMB FUSION     • TUBAL LIGATION Bilateral FAMILY HISTORY:  Family History   Problem Relation Age of Onset   • Breast cancer Mother 59   • Diabetes Mother    • Ovarian cancer Mother 39   • Diabetes Father    • Hypertension Family    • Breast cancer Cousin 52   • No Known Problems Daughter    • No Known Problems Daughter    • No Known Problems Maternal Aunt    • No Known Problems Maternal Aunt    • No Known Problems Maternal Grandmother    • No Known Problems Maternal Grandfather    • No Known Problems Paternal Grandmother    • No Known Problems Paternal Grandfather        SOCIAL HISTORY:  Social History     Tobacco Use   • Smoking status: Never     Passive exposure: Past   • Smokeless tobacco: Never   Vaping Use   • Vaping Use: Never used   Substance Use Topics   • Alcohol use:  Yes     Alcohol/week: 3 0 standard drinks of alcohol     Types: 3 Standard drinks or equivalent per week     Comment: social    • Drug use: No       MEDICATIONS:    Current Outpatient Medications:   •  acyclovir (ZOVIRAX) 5 % ointment, Apply topically every 3 (three) hours (6x a day) (Patient taking differently: Apply topically if needed), Disp: 15 g, Rfl: 5  •  Ascorbic Acid (vitamin C) 100 MG tablet, Take 100 mg by mouth if needed, Disp: , Rfl:   •  betamethasone dipropionate (DIPROSONE) 0 05 % cream, Apply topically 2 (two) times a day (Patient taking differently: Apply topically if needed), Disp: 30 g, Rfl: 0  •  Cholecalciferol (EQL Vitamin D3) 25 MCG (1000 UT) capsule, Take 1,000 Units by mouth daily, Disp: , Rfl:   •  cyanocobalamin (VITAMIN B-12) 100 MCG tablet, Take 100 mcg by mouth daily, Disp: , Rfl:   •  ibuprofen (MOTRIN) 600 mg tablet, Take 200 mg by mouth if needed for mild pain or moderate pain, Disp: , Rfl:   •  multivitamin (THERAGRAN) TABS, Take 1 tablet by mouth daily, Disp: , Rfl:   •  multivitamin (THERAGRAN) TABS, Take 1 tablet by mouth daily, Disp: , Rfl:   •  vitamin E, tocopherol, 400 units capsule, Take 400 Units by mouth daily, Disp: , Rfl:   •  zinc "gluconate 50 mg tablet, Take 50 mg by mouth daily, Disp: , Rfl:     ALLERGIES:  Allergies   Allergen Reactions   • Codeine Anaphylaxis       REVIEW OF SYSTEMS:  Review of Systems   Constitutional: Negative for chills, fatigue and fever  HENT: Negative for hearing loss, nosebleeds and sore throat  Eyes: Negative for redness and visual disturbance  Respiratory: Negative for cough, shortness of breath and wheezing  Cardiovascular: Negative for chest pain, palpitations and leg swelling  Gastrointestinal: Negative for abdominal pain, nausea and vomiting  Endocrine: Negative for polydipsia and polyuria  Genitourinary: Negative for difficulty urinating, dysuria and hematuria  Musculoskeletal: Negative for arthralgias, joint swelling and myalgias  Skin: Negative for rash and wound  Neurological: Negative for speech difficulty, weakness, numbness and headaches  Psychiatric/Behavioral: Negative for decreased concentration and suicidal ideas  The patient is not nervous/anxious          VITALS:  Vitals:    06/14/23 1120   BP: 113/78   Pulse: 67       LABS:  HgA1c: No results found for: \"HGBA1C\"  BMP:   Lab Results   Component Value Date    BUN 14 05/04/2023    CALCIUM 9 9 05/04/2023     05/04/2023    CO2 29 05/04/2023    CREATININE 0 78 05/04/2023    K 3 6 05/04/2023     01/26/2017       _____________________________________________________  PHYSICAL EXAMINATION:  General: well developed and well nourished, alert, oriented times 3 and appears comfortable  Psychiatric: Normal  HEENT: Normocephalic, Atraumatic Trachea Midline, No torticollis  Abdominal: no distention, no tenderness  Pulmonary: No audible wheezing or respiratory distress   Cardiovascular: No pitting edema, 2+ radial pulse   Skin: No masses, erythema, lacerations, fluctation, ulcerations  Neurovascular: Sensation Intact to the Median, Ulnar, Radial Nerve, Motor Intact to the Median, Ulnar, Radial Nerve and Pulses " Intact  Musculoskeletal: Normal, except as noted in detailed exam and in HPI  MUSCULOSKELETAL EXAMINATION:    Left thumb: Well healed surgical incision, sutures intact were removed today   Full digital ROM, opposition intact  No clicking or locking   No erythema or signs of infection     right middle finger:  Negative palpable nodule over the A1 pulley  Positive tenderness to palpation over A1 pulley  Negative catching   Positive clicking         ___________________________________________________  STUDIES REVIEWED:  No studies to review       PROCEDURES PERFORMED:  Procedures  No Procedures performed today    _____________________________________________________      Jennifer Rico

## 2023-08-01 ENCOUNTER — HOSPITAL ENCOUNTER (OUTPATIENT)
Facility: AMBULARY SURGERY CENTER | Age: 58
Setting detail: OUTPATIENT SURGERY
Discharge: HOME | End: 2023-08-01
Attending: SURGERY | Admitting: SURGERY
Payer: COMMERCIAL

## 2023-08-01 VITALS
TEMPERATURE: 97.5 F | DIASTOLIC BLOOD PRESSURE: 63 MMHG | HEIGHT: 65 IN | OXYGEN SATURATION: 100 % | SYSTOLIC BLOOD PRESSURE: 106 MMHG | BODY MASS INDEX: 31.82 KG/M2 | WEIGHT: 191 LBS | RESPIRATION RATE: 18 BRPM | HEART RATE: 72 BPM

## 2023-08-01 DIAGNOSIS — Z47.89 AFTERCARE FOLLOWING SURGERY OF THE MUSCULOSKELETAL SYSTEM: ICD-10-CM

## 2023-08-01 DIAGNOSIS — M65.331 TRIGGER FINGER, RIGHT MIDDLE FINGER: Primary | ICD-10-CM

## 2023-08-01 PROBLEM — M65.312 TRIGGER THUMB, LEFT THUMB: Status: RESOLVED | Noted: 2023-05-30 | Resolved: 2023-08-01

## 2023-08-01 PROCEDURE — 26055 INCISE FINGER TENDON SHEATH: CPT | Performed by: SURGERY

## 2023-08-01 PROCEDURE — NC001 PR NO CHARGE: Performed by: SURGERY

## 2023-08-01 PROCEDURE — 26055 INCISE FINGER TENDON SHEATH: CPT | Performed by: PHYSICIAN ASSISTANT

## 2023-08-01 RX ORDER — HYDROCODONE BITARTRATE AND ACETAMINOPHEN 5; 325 MG/1; MG/1
1 TABLET ORAL EVERY 6 HOURS PRN
Status: DISCONTINUED | OUTPATIENT
Start: 2023-08-01 | End: 2023-08-01 | Stop reason: HOSPADM

## 2023-08-01 RX ORDER — OXYCODONE HYDROCHLORIDE AND ACETAMINOPHEN 5; 325 MG/1; MG/1
1 TABLET ORAL EVERY 4 HOURS PRN
Qty: 5 TABLET | Refills: 0 | Status: SHIPPED | OUTPATIENT
Start: 2023-08-01 | End: 2023-08-11

## 2023-08-01 NOTE — OP NOTE
OPERATIVE REPORT  PATIENT NAME: Judit Dowell  :  1965  MRN: [id number]  Pt Location: [facility] MA[facility]    SURGERY DATE: 23    Surgeon(s) and Role:     Mariangel Seals MD - Primary     * Martha Lobo PA-C - Assisting    Pre-Op Diagnosis:  Trigger finger, right middle finger [M65.331]    Post-Op Diagnosis Codes:     * Trigger finger, right middle finger [M65.331]    Procedure(s):  LONG TRIGGER FINGER RELEASE (Right)    Specimen(s):  No specimens collected during this procedure. Estimated Blood Loss:   Minimal    Anesthesia Type:   Local    IMPLANTS:  * No implants in log *    PERIOPERATIVE ANTIBIOTICS:    none    Tourniquet Time: 3 min  at 250 mmHg          Operative Indications: The patient has a history of Trigger Finger  right  long finger that was recalcitrant to conservative management. The decision was made to bring the patient to the operating room for Trigger Finger Release  right  long finger. Risks of the procedure were explained which include, but are not limited to bleeding; infection; damage to nerves, arteries,veins, tendons; scar; pain; need for reoperation; failure to give desired result; and risks of anaesthesia. All questions were answered to satisfaction and they were willing to proceed. Operative Findings:  Hypertrophy A1 pulley    Complications:   None    Procedure and Technique:  After the patient, site, and procedure were identified, the patient was brought into the operating room in a supine position. Local anaesthesia was provided. A well padded tourniquet was applied to the extremity, set at 250 mmHg. The  right upper extremity was then prepped and drapped in a normal, sterile, orthopedic fashion. A  longitudinal  incision is made in line with the right long finger overlying the A1 pulley. . Dissection was  carried down under loupe magnification. Skin and subcutaneous tissues were sharply incised. The tissue was elevated off the A1 pulley.  The A1 pulley was  identified and incised. There was no retinacular cyst noted. The FDS and FDP were noted to have independent glide after release. The patient was able to fully flex and extend without any triggering. At the completion of the procedure, hemostasis was obtained with cautery and direct pressure. The wounds were copiously irrigated with sterile solution. The wounds were closed with Prolene. Sterile dressings were applied, including Xeroform, gauze, tweeners, webril, ACE. Please note, all sponge, needle, and instrument counts were correct prior to closure. Loupe magnification was utilized. The patient tolerated the procedure well. I was present for the entire procedure., A qualified resident physician was not available. and A physician assistant was required during the procedure for retraction, tissue handling, dissection and suturing.     Patient Disposition:  PACU     SIGNATURE: Shea Nance MD  DATE: 08/01/23  TIME: 1:43 PM Island Pedicle Flap With Canthal Suspension Text: The defect edges were debeveled with a #15 scalpel blade.  Given the location of the defect, shape of the defect and the proximity to free margins an island pedicle advancement flap was deemed most appropriate.  Using a sterile surgical marker, an appropriate advancement flap was drawn incorporating the defect, outlining the appropriate donor tissue and placing the expected incisions within the relaxed skin tension lines where possible. The area thus outlined was incised deep to adipose tissue with a #15 scalpel blade.  The skin margins were undermined to an appropriate distance in all directions around the primary defect and laterally outward around the island pedicle utilizing iris scissors.  There was minimal undermining beneath the pedicle flap. A suspension suture was placed in the canthal tendon to prevent tension and prevent ectropion.

## 2023-08-01 NOTE — H&P
ASSESSMENT/PLAN:       57 yo female 2 weeks s/p left trigger thumb release, doing well. Also with a right long trigger finger     Sutures removed from the left thumb today. Patient may begin scar massage and resume activities as tolerated. New issue of right long finger trigger finger was discussed. Patient would like to avoid injections and proceed directly to surgical release. Risk benefits and alternatives to surgery were discussed and informed consent was signed today for right long trigger finger release under local anesthesia. Postoperative protocol expectations were reviewed and all questions answered to the best of our ability. We will see her 10 to 14 days following surgery for postop evaluation and suture removal     The patient verbalized understanding of exam findings and treatment plan. We engaged in the shared decision-making process and treatment options were discussed at length with the patient. Surgical and conservative management discussed today along with risks and benefits.     Diagnoses and all orders for this visit:     Trigger finger, right middle finger  -     Case request operating room: [facility]; Standing  -     Case request operating room: RELEASE TRIGGER FINGER     S/P trigger finger release     Other orders  -     Nursing Communication [address] [place] [address] Interventions Implemented; Standing  -     Nursing Communication [facility] bath, have staff wash entire body (neck down) per pre-op bathing protocol. Routine, evening prior to, and day of surgery.; Standing  -     Nursing Communication Swab both nares with Povidone-Iodine solution, EXCLUDE if patient has shellfish/Iodine allergy, and replace with nasal alcohol swabstick. Routine, day of surgery, on call to OR; Standing  -     chlorhexidine (PERIDEX) 0.12 % oral rinse 15 mL  -     Void on call to OR; Standing       Trigger Finger Release: The anatomy and physiology of trigger finger was discussed with the patient today in the office. Edema and increased contact pressure within the flexor tendons at the A1 pulley can cause pain, crepitation, and limitation of function. Treatment options include resting MP blocking splints to decrease edema, oral anti-inflammatory medications, home or formal therapy exercises, up to 2 steroid injections or surgical release. While majority of patients do respond to conservative treatment, up to 20% may require surgical release. The patient has elected release of the trigger finger. The patient has elected to undergo a release of the A1 pulley (trigger finger). A small incision will be made over the palmar aspect of the hand, the tendon sheath holding the flexor tendons will be released. In the postoperative period, light activities are allowed immediately, driving is allowed when narcotic medication has stopped, and the incision may get wet after 2 days. Heavy activities (lifting more than approximately 10 pounds) will be allowed after the follow up appointment in 1-2 weeks. While the pain and discomfort within the wrist typically improves rapidly, some residual discomfort may be present for up to 6 weeks. The nodule that is typically palpable in the palmar aspect of the hand will not be removed, as this would necessitate removal of a portion of the flexor tendon, however the catching, clicking, and locking should resolve. Approximate success rate is 98%. The risks and benefits of the procedure were explained to the patient, which include, but are not limited to: Bleeding, infection, recurrence, pain, scar, damage to tendons, damage to nerves, and damage to blood vessels, need for future surgery and complications related to anesthesia. If bony work is done, risks also include malunion and nonunion. These risks, along with alternative conservative treatment options, and postoperative protocols were voiced back and understood by the patient. All questions were answered to the patient's satisfaction.   The patient agrees to comply with a standard postoperative protocol, and is willing to proceed. Education was provided via written and auditory forms. There were no barriers to learning. Written handouts regarding wound care, incision and scar care, and general preoperative information, as well as risks and benefits were provided to the patient.     Follow Up:  No follow-ups on file.        To Do Next Visit:  Re-evaluation of current issue, sutures out      ____________________________________________________________________________________________________________________________________________        CHIEF COMPLAINT:       Chief Complaint   Patient presents with   • Left Hand - Post-op       Trigger thumb, left thumb         SUBJECTIVE:  Aldo Nuno is a 56y.o. year old RHD female who presents for initial postoperative evaluation status post left trigger thumb release. Patient is overall doing very well. She denies any residual pain or any clicking or locking. No fevers or chills. She does note clicking and locking of the right long finger which has been ongoing. She would like to discuss surgical release of the finger. No prior injections         I have personally reviewed all the relevant PMH, PSH, SH, FH, Medications and allergies.      PAST MEDICAL HISTORY:  Medical History        Past Medical History:   Diagnosis Date   • Cervical cancer ([address])       age 27   • Lipoma       last assessed. Sanjay Clifford Sanjay Clifford [facility] 14   resolved. .... 19     • Vertigo              PAST SURGICAL HISTORY:  Surgical History         Past Surgical History:   Procedure Laterality Date   • CERVICAL CONIZATION   W/ LASER         by cold knife   •  SECTION         , ,      • AK TENDON SHEATH INCISION Left 2023     Procedure: THUMB TRIGGER FINGER RELEASE;  Surgeon: Everardo Mcleod MD;  Location: [facility] MAIN OR;  Service: Orthopedics   • SYNOVECTOMY         radical synovectomy of tendon sheath of palm or finger. ...trigger thumb release in 2011    • THUMB FUSION       • TUBAL LIGATION Bilateral              FAMILY HISTORY:  Family History   Family History   Problem Relation Age of Onset   • Breast cancer Mother 59   • Diabetes Mother     • Ovarian cancer Mother 39   • Diabetes Father     • Hypertension Family     • Breast cancer Cousin 52   • No Known Problems Daughter     • No Known Problems Daughter     • No Known Problems Maternal Aunt     • No Known Problems Maternal Aunt     • No Known Problems Maternal Grandmother     • No Known Problems Maternal Grandfather     • No Known Problems Paternal Grandmother     • No Known Problems Paternal Grandfather              SOCIAL HISTORY:  Social History            Tobacco Use   • Smoking status: Never       Passive exposure: Past   • Smokeless tobacco: Never   Vaping Use   • Vaping Use: Never used   Substance Use Topics   • Alcohol use: Yes       Alcohol/week: 3.0 standard drinks of alcohol       Types: 3 Standard drinks or equivalent per week       Comment: social    • Drug use:  No         MEDICATIONS:     Current Outpatient Medications:   •  acyclovir (ZOVIRAX) 5 % ointment, Apply topically every 3 (three) hours (6x a day) (Patient taking differently: Apply topically if needed), Disp: 15 g, Rfl: 5  •  Ascorbic Acid (vitamin C) 100 MG tablet, Take 100 mg by mouth if needed, Disp: , Rfl:   •  betamethasone dipropionate (DIPROSONE) 0.05 % cream, Apply topically 2 (two) times a day (Patient taking differently: Apply topically if needed), Disp: 30 g, Rfl: 0  •  Cholecalciferol (EQL Vitamin D3) 25 MCG (1000 UT) capsule, Take 1,000 Units by mouth daily, Disp: , Rfl:   •  cyanocobalamin (VITAMIN B-12) 100 MCG tablet, Take 100 mcg by mouth daily, Disp: , Rfl:   •  ibuprofen (MOTRIN) 600 mg tablet, Take 200 mg by mouth if needed for mild pain or moderate pain, Disp: , Rfl:   •  multivitamin (THERAGRAN) TABS, Take 1 tablet by mouth daily, Disp: , Rfl:   •  multivitamin ([place]) TABS, Take 1 tablet by mouth daily, Disp: , Rfl:   •  vitamin E, tocopherol, 400 units capsule, Take 400 Units by mouth daily, Disp: , Rfl:   •  zinc gluconate 50 mg tablet, Take 50 mg by mouth daily, Disp: , Rfl:      ALLERGIES:       Allergies   Allergen Reactions   • Codeine Anaphylaxis         REVIEW OF SYSTEMS:  Review of Systems   Constitutional: Negative for chills, fatigue and fever. HENT: Negative for hearing loss, nosebleeds and sore throat. Eyes: Negative for redness and visual disturbance. Respiratory: Negative for cough, shortness of breath and wheezing. Cardiovascular: Negative for chest pain, palpitations and leg swelling. Gastrointestinal: Negative for abdominal pain, nausea and vomiting. Endocrine: Negative for polydipsia and polyuria. Genitourinary: Negative for difficulty urinating, dysuria and hematuria. Musculoskeletal: Negative for arthralgias, joint swelling and myalgias. Skin: Negative for rash and wound. Neurological: Negative for speech difficulty, weakness, numbness and headaches. Psychiatric/Behavioral: Negative for decreased concentration and suicidal ideas.  The patient is not nervous/anxious.          VITALS:      Vitals:     06/14/23 1120   BP: 113/78   Pulse: 67         LABS:  HgA1c: No results found for: "HGBA1C"  BMP:         Lab Results   Component Value Date     BUN 14 05/04/2023     CALCIUM 9.9 05/04/2023      05/04/2023     CO2 29 05/04/2023     CREATININE 0.78 05/04/2023     K 3.6 05/04/2023      01/26/2017         _____________________________________________________  PHYSICAL EXAMINATION:  General: well developed and well nourished, alert, oriented times 3 and appears comfortable  Psychiatric: Normal  HEENT: Normocephalic, Atraumatic Trachea Midline, No torticollis  Abdominal: no distention, no tenderness  Pulmonary: No audible wheezing or respiratory distress   Cardiovascular: No pitting edema, 2+ radial pulse   Skin: No masses, erythema, lacerations, fluctation, ulcerations  Neurovascular: Sensation Intact to the Median, Ulnar, Radial Nerve, Motor Intact to the Median, Ulnar, Radial Nerve and Pulses Intact  Musculoskeletal: Normal, except as noted in detailed exam and in HPI.        MUSCULOSKELETAL EXAMINATION:     Left thumb: Well healed surgical incision, sutures intact were removed today   Full digital ROM, opposition intact  No clicking or locking   No erythema or signs of infection      right middle finger:  Negative palpable nodule over the A1 pulley. Positive tenderness to palpation over A1 pulley. Negative catching.  Positive clicking.          ___________________________________________________  STUDIES REVIEWED:  No studies to review

## 2023-08-01 NOTE — DISCHARGE INSTR - AVS FIRST PAGE
Post Operative Instructions    You have had surgery on your arm today, please read and follow the information below:  Elevate your hand above your elbow during the next 24-48 hours to help with swelling. Place your hand and arm over your head with motion at your shoulder three times a day. Do not apply any cream/ointment/oil to your incisions including antibiotics. Do not soak your hands in standing water (dishwater, tubs, Jacuzzi's, pools, etc.) until given permission (typically 2-3 weeks after injury)    Call the office if you notice any:  Increased numbness or tingling of your hand or fingers that is not relieved with elevation. Increasing pain that is not controlled with medication. Difficulty chewing, breathing, swallowing. Chest pains or shortness of breath. Fever over 101.4 degrees. Bandage: Please keep bandages clean and dry. Remove bandage after 5 days. Once bandages are removed you may wash hands with soap and water. Short showers are okay as well, but please avoid soaking the hand as described above (ie no pools, baths, dirty dish water, hot tubs, ocean/lake water, etc). Sutures will be removed in the office at your first follow up visit, please do not remove them yourself. Please do NOT put any topical agents on the surgical wound including neosporin, peroxide, tea tree oil, vitamin E, etc. as these can delay wound healing. Motion: Move fingers into a fist 5 times a day, DO NOT move any splinted fingers. Weight bearing status: Avoid heavy lifting (>5 pounds) with the extremity that was operated on until follow up appointment. Normal activities of daily living are OK. Ice: Ice for 10 minutes every hour as needed for swelling x 24 hours. Sling: No sling necessary.     Pain medication:   Naproxen 220 mg two times a day (this is over the counter!)  *do not take this medication if you were told by your doctor that you cannot take anti-inflammatories or NSAIDS  Tylenol Extended Release 650 mg every 8 hours (this is over the counter!)   Norco or Percocet one tab every 6 hours ONLY AS NEEDED for severe pain        Follow-up Appointment: 10-14 days with Dr Charanjit Bradford        Please call the office if you have any questions or concerns regarding your post-operative care.

## 2023-08-10 ENCOUNTER — OFFICE VISIT (OUTPATIENT)
Dept: OBGYN CLINIC | Facility: CLINIC | Age: 58
End: 2023-08-10

## 2023-08-10 VITALS — WEIGHT: 190 LBS | BODY MASS INDEX: 31.65 KG/M2 | HEIGHT: 65 IN

## 2023-08-10 DIAGNOSIS — Z98.890 S/P TRIGGER FINGER RELEASE: Primary | ICD-10-CM

## 2023-08-10 PROCEDURE — 99024 POSTOP FOLLOW-UP VISIT: CPT | Performed by: SURGERY

## 2023-08-10 NOTE — PROGRESS NOTES
Assessment/Plan:  Patient ID: Bam Monroe 56 y.o. female   Surgery: Long Trigger Finger Release - Right  Date of Surgery: 8/1/2023    Sutures removed  Begin scar massage  Activity as tolerated      Follow Up:  PRN    To Do Next Visit:         CHIEF COMPLAINT:  Chief Complaint   Patient presents with   • Post-op     Patient has a trigger finger release on 8/1/23 here for suture removal         SUBJECTIVE:  Bam Monroe is a 56y.o. year old female who presents for follow up after Long Trigger Finger Release - Right. Today patient has some residual soreness, swelling, and stiffness in the finger but is otherwise doing very well. No triggering, no fever/chills.        PHYSICAL EXAMINATION:  General: well developed and well nourished, alert, oriented times 3 and appears comfortable  Psychiatric: Normal    MUSCULOSKELETAL EXAMINATION:  Incision: Clean, dry, intact and healed  Surgery Site: normal, no evidence of infection   Range of Motion: opposition intact and full composite fist possible  Neurovascular status: Neuro intact, good cap refill  Activity Restrictions: No restrictions  Done today: Sutures out      STUDIES REVIEWED:  No Studies to review      PROCEDURES PERFORMED:  Procedures  No Procedures performed today      Erendira Uriostegui

## 2023-08-22 ENCOUNTER — OFFICE VISIT (OUTPATIENT)
Dept: FAMILY MEDICINE CLINIC | Facility: CLINIC | Age: 58
End: 2023-08-22
Payer: COMMERCIAL

## 2023-08-22 VITALS
BODY MASS INDEX: 31.65 KG/M2 | SYSTOLIC BLOOD PRESSURE: 108 MMHG | HEIGHT: 65 IN | DIASTOLIC BLOOD PRESSURE: 70 MMHG | WEIGHT: 190 LBS | HEART RATE: 69 BPM | OXYGEN SATURATION: 99 % | TEMPERATURE: 97.5 F

## 2023-08-22 DIAGNOSIS — E78.2 MIXED HYPERLIPIDEMIA: ICD-10-CM

## 2023-08-22 DIAGNOSIS — F33.41 RECURRENT MAJOR DEPRESSIVE DISORDER, IN PARTIAL REMISSION (HCC): ICD-10-CM

## 2023-08-22 DIAGNOSIS — Z00.00 ANNUAL PHYSICAL EXAM: Primary | ICD-10-CM

## 2023-08-22 DIAGNOSIS — M75.21 BICEPS TENDINITIS OF RIGHT UPPER EXTREMITY: ICD-10-CM

## 2023-08-22 PROBLEM — G89.29 CHRONIC PAIN OF LEFT THUMB: Status: RESOLVED | Noted: 2022-12-29 | Resolved: 2023-08-22

## 2023-08-22 PROBLEM — M79.645 CHRONIC PAIN OF LEFT THUMB: Status: RESOLVED | Noted: 2022-12-29 | Resolved: 2023-08-22

## 2023-08-22 PROCEDURE — 99214 OFFICE O/P EST MOD 30 MIN: CPT | Performed by: FAMILY MEDICINE

## 2023-08-22 PROCEDURE — 99396 PREV VISIT EST AGE 40-64: CPT | Performed by: FAMILY MEDICINE

## 2023-08-22 NOTE — PROGRESS NOTES
ADULT ANNUAL [facility]    NAME: William Wallace  AGE: 56 y.o. SEX: female  : 1965     DATE: 2023     Assessment and Plan:     Problem List Items Addressed This Visit        Musculoskeletal and Integument    Biceps tendinitis of right upper extremity     I reviewed with pt. Pt with short bicep tendon irritation from starting her mower. Improving at present. Will continue to monitor. Refer to PT if not resolving in 2w - avoid exacerbating activities in the interim         Relevant Orders    CBC and differential       Other    Annual physical exam - Primary    Hyperlipidemia     Check labs. Monitor diet. Recheck 1y         Relevant Orders    Comprehensive metabolic panel    Lipid panel    Recurrent major depressive disorder, in partial remission ([address] )     Depression Screening Follow-up Plan: Patient's depression screening was positive with a PHQ-2 score of 2 . Their PHQ-9 score was 6. Pt with occ down day and occ anhedonia, but has far more good days than bad. Continue to monitor           Relevant Orders    CBC and differential       Immunizations and preventive care screenings were discussed with patient today. Appropriate education was printed on patient's after visit summary. Counseling:  Exercise: the importance of regular exercise/physical activity was discussed. Recommend exercise 3-5 times per week for at least 30 minutes. Return in about 1 year (around 2024). Chief Complaint:     Chief Complaint   Patient presents with   • Physical Exam      History of Present Illness:     Adult Annual Physical   Patient here for a comprehensive physical exam. The patient reports problems - as below. - pt had L trigger thumb and R hand trigger release this summer. Hand is still a little sore, but both are improving.   - pt has changed jobs several times over the last year or so.  Has some anxiety but denies feeling depressed. - hurt her R shoulder earlier this summer starting her mower. Pain was bad for 3 days or so but has improved. Still has some discomfort - worse with certain positions    Diet and Physical Activity  Diet/Nutrition: inconsistent. Exercise: walking, 3-4 times a week on average and less than 30 minutes on average. Depression Screening  PHQ-2/9 Depression Screening    Little interest or pleasure in doing things: 1 - several days  Feeling down, depressed, or hopeless: 1 - several days  Trouble falling or staying asleep, or sleeping too much: 2 - more than half the days  Feeling tired or having little energy: 1 - several days  Poor appetite or overeatin - several days  Feeling bad about yourself - or that you are a failure or have let yourself or your family down: 0 - not at all  Trouble concentrating on things, such as reading the newspaper or watching television: 0 - not at all  Moving or speaking so slowly that other people could have noticed. Or the opposite - being so fidgety or restless that you have been moving around a lot more than usual: 0 - not at all  Thoughts that you would be better off dead, or of hurting yourself in some way: 0 - not at all  PHQ-9 Score: 6   PHQ-9 Interpretation: Mild depression        General Health  Sleep: inconsistent. R shoulder discomfort and L hand numbness can cause sleep disturbance. Hearing: normal - bilateral.  Vision: most recent eye exam >1 year ago and wears glasses. Dental: regular dental visits. /GYN Health  Patient is: postmenopausal  Last menstrual period: 2+ y ago  Contraceptive method: n/a. Review of Systems:     Review of Systems   Constitutional: Negative. HENT: Negative. Eyes: Negative. Respiratory: Negative. Cardiovascular: Negative. Gastrointestinal: Negative. Endocrine: Negative. Genitourinary: Negative. Musculoskeletal: Positive for arthralgias. Skin: Negative. Allergic/Immunologic: Negative. Neurological: Positive for numbness (L hand). Negative for weakness. Hematological: Negative. Psychiatric/Behavioral: Positive for sleep disturbance. Negative for dysphoric mood. The patient is nervous/anxious. Past Medical History:     Past Medical History:   Diagnosis Date   • Cervical cancer [facility])     age 27   • Lipoma     last assessed. Iliadriana Mckeon Iliadriana SharpeMike Iliadriana Mckeon 16   resolved. .... 19     • Vertigo       Past Surgical History:     Past Surgical History:   Procedure Laterality Date   • CERVICAL CONIZATION   W/ LASER      by cold knife   •  SECTION      , ,      • AR TENDON SHEATH INCISION Left 2023    Procedure: THUMB TRIGGER FINGER RELEASE;  Surgeon: Sanjay Pompa MD;  Location: [facility] MAIN OR;  Service: Orthopedics   • AR TENDON SHEATH INCISION Right 2023    Procedure: LONG TRIGGER FINGER RELEASE;  Surgeon: Sanjay Pompa MD;  Location: [facility] MAIN OR;  Service: Orthopedics   • SYNOVECTOMY      radical synovectomy of tendon sheath of palm or finger. ...trigger thumb release in     • THUMB FUSION     • TUBAL LIGATION Bilateral       Social History:     Social History     Socioeconomic History   • Marital status: /Civil Union     Spouse name: None   • Number of children: None   • Years of education: None   • Highest education level: None   Occupational History     Comment: full time employment   Tobacco Use   • Smoking status: Never     Passive exposure: Past   • Smokeless tobacco: Never   Vaping Use   • Vaping Use: Never used   Substance and Sexual Activity   • Alcohol use:  Yes     Alcohol/week: 3.0 standard drinks of alcohol     Types: 3 Standard drinks or equivalent per week     Comment: social    • Drug use: No   • Sexual activity: Yes   Other Topics Concern   • None   Social History Narrative    Almost always uses seat belts    Daily coffee consumption     Daily tea consumption     Dental care regularly    Exercise walking    Exercise yoga    Pet animals dog Some college    Tea    Water intake adequate per day     Social Determinants of Health     Financial Resource Strain: Not on file   Food Insecurity: Not on file   Transportation Needs: Not on file   Physical Activity: Not on file   Stress: Not on file   Social Connections: Not on file   Intimate Partner Violence: Not on file   Housing Stability: Not on file      Family History:     Family History   Problem Relation Age of Onset   • Breast cancer Mother 59   • Diabetes Mother    • Ovarian cancer Mother 39   • Diabetes Father    • Hypertension Family    • Breast cancer Cousin 52   • No Known Problems Daughter    • No Known Problems Daughter    • No Known Problems Maternal Aunt    • No Known Problems Maternal Aunt    • No Known Problems Maternal Grandmother    • No Known Problems Maternal Grandfather    • No Known Problems Paternal Grandmother    • No Known Problems Paternal Grandfather       Current Medications:     Current Outpatient Medications   Medication Sig Dispense Refill   • acyclovir (ZOVIRAX) 5 % ointment Apply topically every 3 (three) hours (6x a day) (Patient taking differently: Apply topically if needed) 15 g 5   • betamethasone dipropionate (DIPROSONE) 0.05 % cream Apply topically 2 (two) times a day (Patient taking differently: Apply topically if needed) 30 g 0   • cyanocobalamin (VITAMIN B-12) 100 MCG tablet Take 100 mcg by mouth daily     • ibuprofen (MOTRIN) 600 mg tablet Take 200 mg by mouth if needed for mild pain or moderate pain     • multivitamin (THERAGRAN) TABS Take 1 tablet by mouth daily       No current facility-administered medications for this visit. Allergies: Allergies   Allergen Reactions   • Codeine Anaphylaxis      Physical Exam:     /70   Pulse 69   Temp 97.5 °F (36.4 °C)   Ht 5' 5" (1.651 m)   Wt 86.2 kg (190 lb)   LMP 05/09/2021 (Approximate)   SpO2 99%   BMI 31.62 kg/m²     Physical Exam  Vitals reviewed.    Constitutional:       Appearance: She is well-developed. HENT:      Head: Normocephalic and atraumatic. Right Ear: Tympanic membrane, ear canal and external ear normal.      Left Ear: Tympanic membrane, ear canal and external ear normal.   Eyes:      Extraocular Movements: Extraocular movements intact. Conjunctiva/sclera: Conjunctivae normal.      Pupils: Pupils are equal, round, and reactive to light. Neck:      Thyroid: No thyromegaly. Vascular: No JVD. Cardiovascular:      Rate and Rhythm: Normal rate and regular rhythm. Pulses: Normal pulses. Heart sounds: Normal heart sounds. No murmur heard. Pulmonary:      Effort: Pulmonary effort is normal.      Breath sounds: Normal breath sounds. No wheezing. Abdominal:      General: Bowel sounds are normal. There is no distension. Palpations: Abdomen is soft. There is no mass. Tenderness: There is no abdominal tenderness. Musculoskeletal:         General: No swelling, tenderness or deformity. Normal range of motion. Cervical back: Normal range of motion and neck supple. No muscular tenderness. Right lower leg: No edema. Left lower leg: No edema. Comments: TTP over the short head of the R bicep tendon   Lymphadenopathy:      Cervical: No cervical adenopathy. Skin:     General: Skin is warm. Capillary Refill: Capillary refill takes less than 2 seconds. Neurological:      General: No focal deficit present. Mental Status: She is alert and oriented to person, place, and time. Cranial Nerves: No cranial nerve deficit. Sensory: No sensory deficit. Motor: No weakness or abnormal muscle tone. Coordination: Coordination normal.      Gait: Gait normal.      Deep Tendon Reflexes: Reflexes normal.   Psychiatric:         Mood and Affect: Mood normal.         Behavior: Behavior normal.         Thought Content:  Thought content normal.         Judgment: Judgment normal.      Comments: PHQ-2/9 Depression Screening    Little interest or pleasure in doing things: 1 - several days  Feeling down, depressed, or hopeless: 1 - several days  Trouble falling or staying asleep, or sleeping too much: 2 - more than   half the days  Feeling tired or having little energy: 1 - several days  Poor appetite or overeatin - several days  Feeling bad about yourself - or that you are a failure or have let   yourself or your family down: 0 - not at all  Trouble concentrating on things, such as reading the newspaper or watching   television: 0 - not at all  Moving or speaking so slowly that other people could have noticed.  Or the   opposite - being so fidgety or restless that you have been moving around a   lot more than usual: 0 - not at all  Thoughts that you would be better off dead, or of hurting yourself in some   way: 0 - not at all  PHQ-9 Score: 6   PHQ-9 Interpretation: Mild depression           Ioana Nails MD   [address]

## 2023-08-24 PROBLEM — R20.0 NUMBNESS OF LEFT HAND: Status: ACTIVE | Noted: 2023-08-24

## 2023-08-24 PROBLEM — M75.21 BICEPS TENDINITIS OF RIGHT UPPER EXTREMITY: Status: ACTIVE | Noted: 2023-08-24

## 2023-08-24 NOTE — ASSESSMENT & PLAN NOTE
Depression Screening Follow-up Plan: Patient's depression screening was positive with a PHQ-2 score of 2 . Their PHQ-9 score was 6. Pt with occ down day and occ anhedonia, but has far more good days than bad.  Continue to monitor

## 2023-08-24 NOTE — ASSESSMENT & PLAN NOTE
I reviewed with pt. Pt with short bicep tendon irritation from starting her mower. Improving at present. Will continue to monitor.  Refer to PT if not resolving in 2w - avoid exacerbating activities in the interim

## 2023-11-14 ENCOUNTER — OFFICE VISIT (OUTPATIENT)
Age: 58
End: 2023-11-14
Payer: COMMERCIAL

## 2023-11-14 VITALS
BODY MASS INDEX: 31.82 KG/M2 | SYSTOLIC BLOOD PRESSURE: 114 MMHG | HEIGHT: 65 IN | WEIGHT: 191 LBS | DIASTOLIC BLOOD PRESSURE: 68 MMHG

## 2023-11-14 DIAGNOSIS — N85.2 ENLARGED UTERUS: ICD-10-CM

## 2023-11-14 DIAGNOSIS — Z01.419 ROUTINE GYNECOLOGICAL EXAMINATION: Primary | ICD-10-CM

## 2023-11-14 DIAGNOSIS — Z80.3 FAMILY HISTORY OF BREAST CANCER: ICD-10-CM

## 2023-11-14 DIAGNOSIS — Z80.41 FAMILY HISTORY OF OVARIAN CANCER: ICD-10-CM

## 2023-11-14 DIAGNOSIS — Z12.31 ENCOUNTER FOR SCREENING MAMMOGRAM FOR MALIGNANT NEOPLASM OF BREAST: ICD-10-CM

## 2023-11-14 PROCEDURE — 99386 PREV VISIT NEW AGE 40-64: CPT | Performed by: OBSTETRICS & GYNECOLOGY

## 2023-11-14 NOTE — PROGRESS NOTES
Assessment/Plan:    No problem-specific Assessment & Plan notes found for this encounter. {Assess/PlanSmartLinks:48457}      Subjective:      Patient ID: Viet Valdes is a 66 y.o. female.     HPI    {Common ambulatory SmartLinks:46969}    Review of Systems      Objective:      /68 (BP Location: Left arm, Patient Position: Sitting, Cuff Size: Adult)   Ht 5' 4.5" (1.638 m)   Wt 86.6 kg (191 lb)   LMP 05/09/2021 (Approximate)   BMI 32.28 kg/m²          Physical Exam

## 2023-11-14 NOTE — PROGRESS NOTES
Katalina Cárdenas   1965    CC:  Yearly exam    S:  66 y.o. female here for yearly exam. She is postmenopausal and has had no vaginal bleeding. Last cycle was in 2021. Has upcoming appointment with Dr. Karli Cordova to discuss menopausal symptoms. She has several concerns related to this -     *anxiety/depression, this has been enough that her children have commented on it. *hot flashes/night sweats - wakes up 2-3 times a night, will sleep with window open, fans/etc  *low libido/vaginal dryness - this is what she reports to be the most bothersome to her. Her  is patient but she feels like he doesn't really understand. She admits that she does not like to take medication if she doesn't have to. Also has family history of Mom with ovarian cancer in her 29's - had "partial" hysterectomy, and then had breast cancer later in life and had mastectomy. She has a cousin who is also  from breast cancer in her 42's. She denies having genetic testing. Sexual activity: She is sexually active - does have concerns, see above    She does not report urinary incontinence, does have urinary frequency. Does tend toward constipation, but does not need to take anything. No breast concerns. Last Pap:  24 - NILM, neg HPV (Hx CONE biopsy - age 26)  Last Mammo: 25 - Negative  Last Colonoscopy: Age 50 (10yr intervals)    We reviewed ASCCP guidelines for Pap testing. Family hx of breast cancer: Mother, Cousin  Family hx of ovarian cancer:  Mother  Family hx of colon cancer: no      Current Outpatient Medications:     acyclovir (ZOVIRAX) 5 % ointment, Apply topically every 3 (three) hours (6x a day) (Patient taking differently: Apply topically if needed), Disp: 15 g, Rfl: 5    betamethasone dipropionate (DIPROSONE) 0.05 % cream, Apply topically 2 (two) times a day (Patient taking differently: Apply topically if needed), Disp: 30 g, Rfl: 0    ibuprofen (MOTRIN) 600 mg tablet, Take 200 mg by mouth if needed for mild pain or moderate pain, Disp: , Rfl:     multivitamin (THERAGRAN) TABS, Take 1 tablet by mouth daily, Disp: , Rfl:     Patient Active Problem List   Diagnosis    Allergic rhinitis    Disc degeneration, lumbar    Herniated cervical disc    Hyperlipidemia    Spondylosis of lumbar region without myelopathy or radiculopathy    Annual physical exam    Anxiety    Recurrent major depressive disorder, in partial remission (HCC)    Mid back pain    Trigger finger, right middle finger    Numbness of left hand    Biceps tendinitis of right upper extremity     Family History   Problem Relation Age of Onset    Breast cancer Mother 59    Diabetes Mother     Ovarian cancer Mother 39    Diabetes Father     Hypertension Family     Breast cancer Cousin 52    No Known Problems Daughter     No Known Problems Daughter     No Known Problems Maternal Aunt     No Known Problems Maternal Aunt     No Known Problems Maternal Grandmother     No Known Problems Maternal Grandfather     No Known Problems Paternal Grandmother     No Known Problems Paternal Grandfather      Past Medical History:   Diagnosis Date    Cervical cancer ([address])     age 27    Lipoma     last assessed. Iline Mike Iline Mike Iline Mike 9/17/14   resolved. .... 1/26/17      Vertigo       Review of Systems   Respiratory: Negative. Cardiovascular: Negative. Gastrointestinal: Negative for constipation and diarrhea. Genitourinary: Negative for difficulty urinating, pelvic pain, vaginal bleeding, vaginal discharge, itching or odor. O:  Blood pressure 114/68, height 5' 4.5" (1.638 m), weight 86.6 kg (191 lb), last menstrual period 05/09/2021, not currently breastfeeding. Patient appears well and is not in distress  Breasts are symmetrical without mass, tenderness, nipple discharge, skin changes or adenopathy. Abdomen is soft and nontender without masses. External genitals are normal without lesions or rashes.   Urethral meatus and urethra are normal  Bladder is normal to palpation  Vagina is normal without discharge or bleeding, generalized atrophic changes present   Cervix is normal without discharge or lesion. Uterus is mildly enlarged, mobile, mildly tender without palpable mass. Adnexa are normal, nontender, without palpable mass. A:  Yearly exam.     P:   Pap & HPV up to date  Mammo scheduled   Colon Cancer Screening up to date    `  Pelvic ultrasound ordered - for baseline due to size on palpation     Discussed options for management of her symptoms. We discussed:  vaginal moisturizers, vaginal estrogen, coconut oil, effexor/paxil, HRT. Wr briefly touched on how these could help her symptoms. She will consider. Discussed the most of these medications are not to be used on a "when needed" basis. She will consider and [facility]. Genetic counseling referral placed due to family histoyr. Reviewed considerations of menopause, to call with any postmenopausal bleeding or other concerns. RTO one year for yearly exam or sooner as needed.

## 2023-11-15 ENCOUNTER — TELEPHONE (OUTPATIENT)
Dept: HEMATOLOGY ONCOLOGY | Facility: CLINIC | Age: 58
End: 2023-11-15

## 2023-11-15 NOTE — TELEPHONE ENCOUNTER
I called Abdulkadir to schedule a new patient appointment with the Cancer Risk and Genetics Program.      Outcome:   I left a voice message encouraging the patient to call the [facility] at [phone] to schedule this appointment. A [employer] message was also send with our contact information. Follow-up:   At this time the referral will be closed and we will wait to hear back from the patient regarding scheduling this appointment.

## 2023-11-16 ENCOUNTER — TELEPHONE (OUTPATIENT)
Dept: HEMATOLOGY ONCOLOGY | Facility: CLINIC | Age: 58
End: 2023-11-16

## 2023-11-16 NOTE — TELEPHONE ENCOUNTER
I spoke with Abdulkadir to schedule their consultation with Cancer Risk and Genetics. Scheduling Outcome: Patient is scheduled for an appointment on 5/13/24 at 9:30am with Lori Polanco    Personal/Family History Related to Appointment:     Personal History of Cancer: Patient reports personal history of Cervical Cancer    Family History of Cancer: Patient reports family history of Mother-Ovarian, Breast and Skin, M. Cousin - Breast cancer (passed away)     Is patient of [address] [place] [address] [address]?: No    History of Genetic Testing:  Personal History of Genetic Testing: Patient report no personal history of Genetic Testing. Family History of Genetic Testing: Patient reports that no family members have had Genetic Testing. Progeny:  Is patient able to complete our family history questionnaire on a computer?:  Yes    Patient's preferred e-mail address: [email or web address]

## 2023-11-22 ENCOUNTER — HOSPITAL ENCOUNTER (OUTPATIENT)
Dept: RADIOLOGY | Age: 58
Discharge: HOME | End: 2023-11-22
Payer: COMMERCIAL

## 2023-11-22 DIAGNOSIS — N85.2 ENLARGED UTERUS: ICD-10-CM

## 2023-11-22 PROCEDURE — 76856 US EXAM PELVIC COMPLETE: CPT

## 2023-11-22 PROCEDURE — 76830 TRANSVAGINAL US NON-OB: CPT

## 2024-01-24 ENCOUNTER — OFFICE VISIT (OUTPATIENT)
Dept: OBGYN CLINIC | Facility: CLINIC | Age: 59
End: 2024-01-24
Payer: COMMERCIAL

## 2024-01-24 DIAGNOSIS — R68.82 DECREASED LIBIDO: ICD-10-CM

## 2024-01-24 DIAGNOSIS — N95.1 MENOPAUSAL SYMPTOMS: Primary | ICD-10-CM

## 2024-01-24 PROCEDURE — 99215 OFFICE O/P EST HI 40 MIN: CPT | Performed by: OBSTETRICS & GYNECOLOGY

## 2024-01-27 PROBLEM — Z00.00 ANNUAL PHYSICAL EXAM: Status: RESOLVED | Noted: 2018-02-06 | Resolved: 2024-01-27

## 2024-01-27 RX ORDER — ESTRADIOL 1 MG/1
1 TABLET ORAL DAILY
Qty: 30 TABLET | Refills: 11 | Status: SHIPPED | OUTPATIENT
Start: 2024-01-27

## 2024-01-27 RX ORDER — PROGESTERONE 100 MG/1
100 CAPSULE ORAL
Qty: 30 CAPSULE | Refills: 11 | Status: SHIPPED | OUTPATIENT
Start: 2024-01-27

## 2024-01-27 NOTE — PROGRESS NOTES
Assessment/Plan:       Diagnoses and all orders for this visit:    Menopausal symptoms  -     estradiol (Estrace) 1 mg tablet; Take 1 tablet (1 mg total) by mouth daily  -     Progesterone 100 MG CAPS; Take 100 mg by mouth at bedtime    Decreased libido  -     Testosterone; Future  -     DHEA-sulfate; Future        1) Controversies surrounding estrogen based HRT discussed, including the fear based off the [facility] trials concerning Prempro. I will not be prescribing Prempro. One should not assume all therapies confer the same risk or benefit. Stay tuned to the REPLEN[place] trials for E2/PG therapy in the [place] with Bijuva, but European trials with same medication point to safety and efficacy with superior health outcomes with women on this type of HRT.  2) I discussed the long term health benefits of E2/PG, including: reduction of CVD risk, reduction of hyperlipidemia, reduction of DM and GLP-1 agonist activity with mild appetite suppression; reduction of colon CA, osteoporosis and cognitive decline, Alzheimer's disease.  3) She is still a candidate for HRT despite mothers breast cancer diagnosis. Also briefly discussed Duavee, CEE/MEGHAN combo with actual breast cancer prevention indication  4) Libido is a testosterone issue. Testosterone supplementation discussed in detail, including lack of FDA approval for women and cost concerns, as insurance will not reimburse. Side effects include: increased libido, increased muscle mass, decreased fat mass, erythrocytosis ( NOT polycythemia rubra), increased energy, acne, increased hair growth or loss. I would need lab assessment to see if she is a candidate for this.  5) She would like to start with conventional ovarian MHT first. Agree. Start with oral estradiol 1 mg/ progesterone 100 mg nightly. Side effects of HRT reviewed including vaginal bleeding, breast tenderness and somnolence, easily remedied with dose adjustment.   6) Email with progress report in one month. Labs  for adrenal axis ordered if she wants to pursue testosterone testing at a later date. Follow up prn results.     This was a 60 minute visit with greater than 50% of time spent in face to face counseling and coordination of care.    Subjective:      Patient ID: Abdulkadir Roman is a 58 y.o. female.    Abdulkadir presents for hormonal consult, her first visit with me; self referred, sees Dr. Avelar for gyn care.   Abdulkadir has been menopausal for 2 years. Complains of:  1) Hot flashes  2) mood swings, agitation, irritable  3) Low motivation to complete tasks. Is not using CBD gummies, which she admits helps with mood, and in turn, motivation.   4) Decreased libido  5) Vaginal dryness despite lubricant use for intercourse.  PMXH:disc degeneration in lumbar spine, stable with injections, PT; lipid  SHX: denies tobacco, ETOH  FHX: Mom alive 78; Breast ca age 65; DM, CVD, lipid. MGM EOTH. MGF unknown,  young. Dad DM2, AMI. He was adopted, rest of family hx unknown. No siblings. 3 kids, ages 37/33/29, one with depression.         Review of Systems   Constitutional:  Positive for fatigue.   Gastrointestinal: Negative.    Endocrine: Positive for heat intolerance.   Genitourinary:  Positive for dyspareunia.        Decreased libido   Musculoskeletal: Negative.    Skin: Negative.    Psychiatric/Behavioral:  Positive for agitation, decreased concentration and dysphoric mood.        Objective:      LMP 2021 (Approximate)          Physical Exam  Constitutional:       Appearance: Normal appearance.   Neurological:      Mental Status: She is alert.

## 2024-01-31 ENCOUNTER — PATIENT MESSAGE (OUTPATIENT)
Dept: FAMILY MEDICINE CLINIC | Facility: CLINIC | Age: 59
End: 2024-01-31

## 2024-01-31 ENCOUNTER — PATIENT MESSAGE (OUTPATIENT)
Age: 59
End: 2024-01-31

## 2024-01-31 DIAGNOSIS — B00.1 HERPES LABIALIS: ICD-10-CM

## 2024-01-31 DIAGNOSIS — B00.1 HERPES LABIALIS: Primary | ICD-10-CM

## 2024-01-31 RX ORDER — ACYCLOVIR 50 MG/G
OINTMENT TOPICAL
Qty: 15 G | Refills: 0 | Status: SHIPPED | OUTPATIENT
Start: 2024-01-31

## 2024-02-02 ENCOUNTER — TELEPHONE (OUTPATIENT)
Dept: FAMILY MEDICINE CLINIC | Facility: CLINIC | Age: 59
End: 2024-02-02

## 2024-02-02 NOTE — TELEPHONE ENCOUNTER
PA for Acyclovir (ZOVIRAX) 5 % ointment     Submitted via  [x]CMM-KEY: [id number]  []Surescripts-Case ID #   []Faxed to plan   []Other website   []Phone call Case ID #     Office notes sent, clinical questions answered. Awaiting determination

## 2024-02-08 NOTE — TELEPHONE ENCOUNTER
PA for Acyclovir (ZOVIRAX) 5 % ointment Approved     Date(s) approved 2-6-2024 to 2-7-2025        Patient advised by [x] [employer] Message                      [] Phone call       Pharmacy advised by [x]Fax                                     []Phone call    Approval letter scanned into Media Yes

## 2024-02-09 RX ORDER — VALACYCLOVIR HYDROCHLORIDE 1 G/1
TABLET, FILM COATED ORAL
Qty: 10 TABLET | Refills: 0 | Status: SHIPPED | OUTPATIENT
Start: 2024-02-09 | End: 2024-02-13

## 2024-03-19 LAB
ALBUMIN SERPL-MCNC: 4.3 G/DL (ref 3.5–5.7)
ALP SERPL-CCNC: 90 U/L (ref 35–120)
ALT SERPL-CCNC: 18 U/L
ANION GAP SERPL CALCULATED.3IONS-SCNC: 11 MMOL/L (ref 3–11)
AST SERPL-CCNC: 20 U/L
BASOPHILS # BLD AUTO: 0 THOU/CMM (ref 0–0.1)
BASOPHILS NFR BLD AUTO: 1 %
BILIRUB SERPL-MCNC: 0.3 MG/DL (ref 0.2–1)
BUN SERPL-MCNC: 20 MG/DL (ref 7–25)
CALCIUM SERPL-MCNC: 9.4 MG/DL (ref 8.5–10.1)
CHLORIDE SERPL-SCNC: 105 MMOL/L (ref 100–109)
CHOLEST SERPL-MCNC: 213 MG/DL
CHOLEST/HDLC SERPL: 2.7 {RATIO}
CO2 SERPL-SCNC: 26 MMOL/L (ref 21–31)
CREAT SERPL-MCNC: 0.9 MG/DL (ref 0.4–1.1)
CYTOLOGY CMNT CVX/VAG CYTO-IMP: NORMAL
DHEA-S SERPL-MCNC: 64 UG/DL (ref 8–188)
DIFFERENTIAL METHOD BLD: ABNORMAL
EOSINOPHIL # BLD AUTO: 0.1 THOU/CMM (ref 0–0.5)
EOSINOPHIL NFR BLD AUTO: 1 %
ERYTHROCYTE [DISTWIDTH] IN BLOOD BY AUTOMATED COUNT: 15.2 % (ref 12–16)
GFR/BSA.PRED SERPLBLD CYS-BASED-ARV: 75 ML/MIN/{1.73_M2}
GLUCOSE SERPL-MCNC: 98 MG/DL (ref 65–99)
HCT VFR BLD AUTO: 36.5 % (ref 35–43)
HDLC SERPL-MCNC: 79 MG/DL (ref 23–92)
HGB BLD-MCNC: 11.6 G/DL (ref 11.5–14.5)
LDLC SERPL CALC-MCNC: 118 MG/DL
LYMPHOCYTES # BLD AUTO: 1.8 THOU/CMM (ref 1–3)
LYMPHOCYTES NFR BLD AUTO: 36 %
MCH RBC QN AUTO: 24.9 PG (ref 26–34)
MCHC RBC AUTO-ENTMCNC: 31.7 G/DL (ref 32–37)
MCV RBC AUTO: 79 FL (ref 80–100)
MONOCYTES # BLD AUTO: 0.5 THOU/CMM (ref 0.3–1)
MONOCYTES NFR BLD AUTO: 9 %
NEUTROPHILS # BLD AUTO: 2.6 THOU/CMM (ref 1.8–7.8)
NEUTROPHILS NFR BLD AUTO: 53 %
NONHDLC SERPL-MCNC: 134 MG/DL
PLATELET # BLD AUTO: 362 THOU/CMM (ref 140–350)
PMV BLD REES-ECKER: 8 FL (ref 7.5–11.3)
POTASSIUM SERPL-SCNC: 4.9 MMOL/L (ref 3.5–5.2)
PROT SERPL-MCNC: 7.2 G/DL (ref 6.3–8.3)
RBC # BLD AUTO: 4.64 MILL/CMM (ref 3.7–4.7)
SODIUM SERPL-SCNC: 142 MMOL/L (ref 135–145)
TESTOST SERPL-MCNC: 31 NG/DL
TRIGL SERPL-MCNC: 81 MG/DL
WBC # BLD AUTO: 4.9 THOU/CMM (ref 4–10)

## 2024-05-06 ENCOUNTER — TELEPHONE (OUTPATIENT)
Dept: HEMATOLOGY ONCOLOGY | Facility: CLINIC | Age: 59
End: 2024-05-06

## 2024-05-06 NOTE — TELEPHONE ENCOUNTER
Appointment Change  Cancel, Reschedule, Change to Virtual      Who are you speaking with? Patient   If it is not the patient, is the caller listed on the communication consent form? Yes   Which provider is the appointment scheduled with? augustine   When was the original appointment scheduled?    Please list date and time 5/13   At which location is the appointment scheduled to take place? [place]   Was the appointment rescheduled?     Was the appointment changed from an in person visit to a virtual visit?    If so, please list the details of the change. 11/12 9:30am VIRTUAL   What is the reason for the appointment change? Insurance change, patient would prefer to wait until effective       Was STAR transport scheduled? No   Does STAR transport need to be scheduled for the new visit (if applicable) No   Does the patient need an infusion appointment rescheduled? No   Does the patient have an upcoming infusion appointment scheduled? If so, when? No   Is the patient undergoing chemotherapy? No   For appointments cancelled with less than 24 hours:  Was the no-show policy reviewed? Yes

## 2024-06-26 ENCOUNTER — HOSPITAL ENCOUNTER (OUTPATIENT)
Dept: MAMMOGRAPHY | Facility: HOSPITAL | Age: 59
Discharge: HOME | End: 2024-06-26
Payer: COMMERCIAL

## 2024-06-26 DIAGNOSIS — Z12.31 ENCOUNTER FOR SCREENING MAMMOGRAM FOR MALIGNANT NEOPLASM OF BREAST: ICD-10-CM

## 2024-06-26 PROCEDURE — 77067 SCR MAMMO BI INCL CAD: CPT

## 2024-06-26 PROCEDURE — 77063 BREAST TOMOSYNTHESIS BI: CPT

## 2024-08-26 ENCOUNTER — OFFICE VISIT (OUTPATIENT)
Dept: FAMILY MEDICINE CLINIC | Facility: CLINIC | Age: 59
End: 2024-08-26
Payer: COMMERCIAL

## 2024-08-26 VITALS
BODY MASS INDEX: 32.17 KG/M2 | SYSTOLIC BLOOD PRESSURE: 124 MMHG | OXYGEN SATURATION: 99 % | TEMPERATURE: 97.4 F | DIASTOLIC BLOOD PRESSURE: 78 MMHG | HEIGHT: 64 IN | WEIGHT: 188.4 LBS | HEART RATE: 63 BPM

## 2024-08-26 DIAGNOSIS — G89.29 CHRONIC MIDLINE LOW BACK PAIN, UNSPECIFIED WHETHER SCIATICA PRESENT: ICD-10-CM

## 2024-08-26 DIAGNOSIS — M47.816 SPONDYLOSIS OF LUMBAR REGION WITHOUT MYELOPATHY OR RADICULOPATHY: ICD-10-CM

## 2024-08-26 DIAGNOSIS — G89.29 MID BACK PAIN, CHRONIC: ICD-10-CM

## 2024-08-26 DIAGNOSIS — Z00.00 ANNUAL PHYSICAL EXAM: Primary | ICD-10-CM

## 2024-08-26 DIAGNOSIS — M54.9 MID BACK PAIN, CHRONIC: ICD-10-CM

## 2024-08-26 DIAGNOSIS — M54.50 CHRONIC MIDLINE LOW BACK PAIN, UNSPECIFIED WHETHER SCIATICA PRESENT: ICD-10-CM

## 2024-08-26 PROBLEM — M65.331 TRIGGER FINGER, RIGHT MIDDLE FINGER: Status: RESOLVED | Noted: 2022-12-29 | Resolved: 2024-08-26

## 2024-08-26 PROCEDURE — 99214 OFFICE O/P EST MOD 30 MIN: CPT | Performed by: FAMILY MEDICINE

## 2024-08-26 PROCEDURE — 99396 PREV VISIT EST AGE 40-64: CPT | Performed by: FAMILY MEDICINE

## 2024-08-26 NOTE — PATIENT INSTRUCTIONS
"Patient Education     Routine physical for adults   The Basics   Written by the doctors and editors at [facility]   What is a physical? -- A physical is a routine visit, or \"check-up,\" with your doctor. You might also hear it called a \"wellness visit\" or \"preventive visit.\"  During each visit, the doctor will:   Ask about your physical and mental health   Ask about your habits, behaviors, and lifestyle   Do an exam   Give you vaccines if needed   Talk to you about any medicines you take   Give advice about your health   Answer your questions  Getting regular check-ups is an important part of taking care of your health. It can help your doctor find and treat any problems you have. But it's also important for preventing health problems.  A routine physical is different from a \"sick visit.\" A sick visit is when you see a doctor because of a health concern or problem. Since physicals are scheduled ahead of time, you can think about what you want to ask the doctor.  How often should I get a physical? -- It depends on your age and health. In general, for people age 21 years and older:   If you are younger than 50 years, you might be able to get a physical every 3 years.   If you are 50 years or older, your doctor might recommend a physical every year.  If you have an ongoing health condition, like diabetes or high blood pressure, your doctor will probably want to see you more often.  What happens during a physical? -- In general, each visit will include:   Physical exam - The doctor or nurse will check your height, weight, heart rate, and blood pressure. They will also look at your eyes and ears. They will ask about how you are feeling and whether you have any symptoms that bother you.   Medicines - It's a good idea to bring a list of all the medicines you take to each doctor visit. Your doctor will talk to you about your medicines and answer any questions. Tell them if you are having any side effects that bother you. You " "should also tell them if you are having trouble paying for any of your medicines.   Habits and behaviors - This includes:   Your diet   Your exercise habits   Whether you smoke, drink alcohol, or use drugs   Whether you are sexually active   Whether you feel safe at home  Your doctor will talk to you about things you can do to improve your health and lower your risk of health problems. They will also offer help and support. For example, if you want to quit smoking, they can give you advice and might prescribe medicines. If you want to improve your diet or get more physical activity, they can help you with this, too.   Lab tests, if needed - The tests you get will depend on your age and situation. For example, your doctor might want to check your:   Cholesterol   Blood sugar   Iron level   Vaccines - The recommended vaccines will depend on your age, health, and what vaccines you already had. Vaccines are very important because they can prevent certain serious or deadly infections.   Discussion of screening - \"Screening\" means checking for diseases or other health problems before they cause symptoms. Your doctor can recommend screening based on your age, risk, and preferences. This might include tests to check for:   Cancer, such as breast, prostate, cervical, ovarian, colorectal, prostate, lung, or skin cancer   Sexually transmitted infections, such as chlamydia and gonorrhea   Mental health conditions like depression and anxiety  Your doctor will talk to you about the different types of screening tests. They can help you decide which screenings to have. They can also explain what the results might mean.   Answering questions - The physical is a good time to ask the doctor or nurse questions about your health. If needed, they can refer you to other doctors or specialists, too.  Adults older than 65 years often need other care, too. As you get older, your doctor will talk to you about:   How to prevent falling at " home   Hearing or vision tests   Memory testing   How to take your medicines safely   Making sure that you have the help and support you need at home  All topics are updated as new evidence becomes available and our peer review process is complete.  This topic retrieved from [employer] on: May 02, 2024.  Topic 766270 Version 1.0  Release: 32.4.3 - C32.122  © 2024 UpToDate, Inc. and/or its affiliates. All rights reserved.  Consumer Information Use and Disclaimer   Disclaimer: This generalized information is a limited summary of diagnosis, treatment, and/or medication information. It is not meant to be comprehensive and should be used as a tool to help the user understand and/or assess potential diagnostic and treatment options. It does NOT include all information about conditions, treatments, medications, side effects, or risks that may apply to a specific patient. It is not intended to be medical advice or a substitute for the medical advice, diagnosis, or treatment of a health care provider based on the health care provider's examination and assessment of a patient's specific and unique circumstances. Patients must speak with a health care provider for complete information about their health, medical questions, and treatment options, including any risks or benefits regarding use of medications. This information does not endorse any treatments or medications as safe, effective, or approved for treating a specific patient. UpToDate, Inc. and its affiliates disclaim any warranty or liability relating to this information or the use thereof.The use of this information is governed by the Terms of Use, available at https://www.woltersSeguriceluwer.com/en/know/clinical-effectiveness-terms. 2024© UpToDate, Inc. and its affiliates and/or licensors. All rights reserved.  Copyright   © 2024 UpToDate, Inc. and/or its affiliates. All rights reserved.

## 2024-08-26 NOTE — PROGRESS NOTES
"Adult Annual Physical  Name: Abdulkadir Roman      : 1965      MRN: [id number]  Encounter Provider: Parminder Bermeo MD  Encounter Date: 2024   Encounter department: [facility]    Assessment & Plan   1. Annual physical exam  2. Mid back pain, chronic  Assessment & Plan:  More recent onset compared to low back issues. Has been doing home PT exercises without improvement.  Breast size may be playing a role. Will refer pt to Plastic surgery for eval.  Continue conservative care in the interim  Orders:  -     Ambulatory Referral to Plastic Surgery; Future  3. Chronic midline low back pain, unspecified whether sciatica present  Assessment & Plan:  I reviewed with pt. Failed multiple treatment modalities including PT, chiropractic treatment and pain management injections. . Breast size may be playing a role re; the chronic pain.  Will refer to Plastic Surgery for eval.   Orders:  -     Ambulatory Referral to Plastic Surgery; Future  4. Spondylosis of lumbar region without myelopathy or radiculopathy  Assessment & Plan:  I reviewed with pt. Failed multiple treatment modalities including PT, chiropractic treatment and pain management injections. . Breast size may be playing a role re; the chronic pain.  Will refer to Plastic Surgery for eval.      Immunizations and preventive care screenings were discussed with patient today. Appropriate education was printed on patient's after visit summary.    Counseling:  Exercise: the importance of regular exercise/physical activity was discussed. Recommend exercise 3-5 times per week for at least 30 minutes.          History of Present Illness     Adult Annual Physical:  Patient presents for annual physical.   - has a chronic mid back \"ache\" as well as low back pain.  Has seen pain management, chiropractor and gone through PT. Pt would like to look at breast reduction again (had spoken to one \"years ago\" who did not feel that it was " necessary).   - otherwise, no other complaints.     Diet and Physical Activity:  - Diet/Nutrition: well balanced diet.  - Exercise: walking, moderate cardiovascular exercise, 5-7 times a week on average and 30-60 minutes on average.    Depression Screening:    - PHQ-9 Score: 0    General Health:  - Sleep:. sleep labile  - Hearing: normal hearing bilateral ears.  - Vision: wears glasses, goes for regular eye exams and most recent eye exam > 1 year ago.  - Dental: regular dental visits and brushes teeth twice daily.    /GYN Health:  - Follows with GYN: yes.   - Menopause: postmenopausal.   - Contraception:. LMP 3/2022      Advanced Care Planning:  - Has an advanced directive?: no    - Has a durable medical POA?: no    - ACP document given to patient?: no      Review of Systems   Constitutional: Negative.    HENT: Negative.     Eyes: Negative.    Respiratory: Negative.     Cardiovascular: Negative.    Gastrointestinal: Negative.    Endocrine: Negative.    Genitourinary: Negative.    Musculoskeletal:  Positive for back pain. Negative for arthralgias, myalgias and neck pain.   Skin: Negative.    Allergic/Immunologic: Negative.    Neurological: Negative.    Hematological: Negative.    Psychiatric/Behavioral: Negative.       Past Medical History   Past Medical History:   Diagnosis Date   • Cervical cancer (HCC)     age 24   • Lipoma     last assessed....14   resolved.....17     • Vertigo      Past Surgical History:   Procedure Laterality Date   • CERVICAL CONIZATION   W/ LASER      by cold knife   •  SECTION      , ,      • CO TENDON SHEATH INCISION Left 2023    Procedure: THUMB TRIGGER FINGER RELEASE;  Surgeon: Christina Mina MD;  [facility];  Service: Orthopedics   • CO TENDON SHEATH INCISION Right 2023    Procedure: LONG TRIGGER FINGER RELEASE;  Surgeon: Christina Mina MD;  Location[facility];  Service: Orthopedics   • SYNOVECTOMY      radical  synovectomy of tendon sheath of palm or finger....trigger thumb release in 2011    • THUMB FUSION     • TUBAL LIGATION Bilateral      Family History   Problem Relation Age of Onset   • Breast cancer Mother 64   • Diabetes Mother    • Ovarian cancer Mother 36   • Diabetes Father    • Hypertension Family    • Breast cancer Cousin 49   • No Known Problems Daughter    • No Known Problems Daughter    • No Known Problems Maternal Aunt    • No Known Problems Maternal Aunt    • No Known Problems Maternal Grandmother    • No Known Problems Maternal Grandfather    • No Known Problems Paternal Grandmother    • No Known Problems Paternal Grandfather      Current Outpatient Medications on File Prior to Visit   Medication Sig Dispense Refill   • acyclovir (ZOVIRAX) 5 % ointment Apply topically every 3 (three) hours (6x a day) (Patient taking differently: Apply topically as needed (6x a day)) 15 g 0   • estradiol (Estrace) 1 mg tablet Take 1 tablet (1 mg total) by mouth daily 30 tablet 11   • ibuprofen (MOTRIN) 600 mg tablet Take 200 mg by mouth if needed for mild pain or moderate pain     • multivitamin (THERAGRAN) TABS Take 1 tablet by mouth daily     • Progesterone 100 MG CAPS Take 100 mg by mouth at bedtime 30 capsule 11     No current facility-administered medications on file prior to visit.     Allergies   Allergen Reactions   • Codeine Anaphylaxis      Current Outpatient Medications on File Prior to Visit   Medication Sig Dispense Refill   • acyclovir (ZOVIRAX) 5 % ointment Apply topically every 3 (three) hours (6x a day) (Patient taking differently: Apply topically as needed (6x a day)) 15 g 0   • estradiol (Estrace) 1 mg tablet Take 1 tablet (1 mg total) by mouth daily 30 tablet 11   • ibuprofen (MOTRIN) 600 mg tablet Take 200 mg by mouth if needed for mild pain or moderate pain     • multivitamin (THERAGRAN) TABS Take 1 tablet by mouth daily     • Progesterone 100 MG CAPS Take 100 mg by mouth at bedtime 30 capsule 11  "    No current facility-administered medications on file prior to visit.      Social History     Tobacco Use   • Smoking status: Never     Passive exposure: Past   • Smokeless tobacco: Never   Vaping Use   • Vaping status: Never Used   Substance and Sexual Activity   • Alcohol use: Yes     Alcohol/week: 3.0 standard drinks of alcohol     Types: 3 Standard drinks or equivalent per week     Comment: social    • Drug use: No   • Sexual activity: Yes     Birth control/protection: Female Sterilization       Objective     /78   Pulse 63   Temp (!) 97.4 °F (36.3 °C)   Ht 5' 4.25\" (1.632 m)   Wt 85.5 kg (188 lb 6.4 oz)   LMP 05/09/2021 (Approximate)   SpO2 99%   BMI 32.09 kg/m²     Physical Exam  Vitals reviewed.   Constitutional:       Appearance: She is well-developed.   HENT:      Head: Normocephalic and atraumatic.      Right Ear: Tympanic membrane, ear canal and external ear normal.      Left Ear: Tympanic membrane, ear canal and external ear normal.      Nose: Nose normal.      Mouth/Throat:      Mouth: Mucous membranes are moist.   Eyes:      Extraocular Movements: Extraocular movements intact.      Conjunctiva/sclera: Conjunctivae normal.      Pupils: Pupils are equal, round, and reactive to light.   Neck:      Thyroid: No thyromegaly.      Vascular: No JVD.   Cardiovascular:      Rate and Rhythm: Normal rate and regular rhythm.      Pulses: Normal pulses.      Heart sounds: Normal heart sounds. No murmur heard.  Pulmonary:      Effort: Pulmonary effort is normal.      Breath sounds: Normal breath sounds. No wheezing.   Abdominal:      General: Bowel sounds are normal. There is no distension.      Palpations: Abdomen is soft. There is no mass.      Tenderness: There is no abdominal tenderness.   Musculoskeletal:         General: Tenderness (mid and low lumbar spine. No spasm appreciated) present. No swelling or deformity. Normal range of motion.      Cervical back: Normal range of motion and neck " supple. No muscular tenderness.      Right lower leg: No edema.      Left lower leg: No edema.   Lymphadenopathy:      Cervical: No cervical adenopathy.   Skin:     General: Skin is warm.      Capillary Refill: Capillary refill takes less than 2 seconds.   Neurological:      General: No focal deficit present.      Mental Status: She is alert and oriented to person, place, and time.      Cranial Nerves: No cranial nerve deficit.      Sensory: No sensory deficit.      Motor: No weakness or abnormal muscle tone.      Coordination: Coordination normal.      Gait: Gait normal.      Deep Tendon Reflexes: Reflexes normal.   Psychiatric:         Mood and Affect: Mood normal.         Behavior: Behavior normal.         Thought Content: Thought content normal.         Judgment: Judgment normal.      Comments: PHQ-2/9 Depression Screening    Little interest or pleasure in doing things: 0 - not at all  Feeling down, depressed, or hopeless: 0 - not at all  Trouble falling or staying asleep, or sleeping too much: 0 - not at all  Feeling tired or having little energy: 0 - not at all  Poor appetite or overeatin - not at all  Feeling bad about yourself - or that you are a failure or have let   yourself or your family down: 0 - not at all  Trouble concentrating on things, such as reading the newspaper or watching   television: 0 - not at all  Moving or speaking so slowly that other people could have noticed. Or the   opposite - being so fidgety or restless that you have been moving around a   lot more than usual: 0 - not at all  Thoughts that you would be better off dead, or of hurting yourself in some   way: 0 - not at all  PHQ-9 Score: 0  PHQ-9 Interpretation: No or Minimal depression

## 2024-08-27 ENCOUNTER — TELEPHONE (OUTPATIENT)
Age: 59
End: 2024-08-27

## 2024-08-27 PROBLEM — F41.9 ANXIETY: Status: RESOLVED | Noted: 2018-02-06 | Resolved: 2024-08-27

## 2024-08-27 PROBLEM — F33.41 RECURRENT MAJOR DEPRESSIVE DISORDER, IN PARTIAL REMISSION (HCC): Status: RESOLVED | Noted: 2019-02-27 | Resolved: 2024-08-27

## 2024-08-27 NOTE — ASSESSMENT & PLAN NOTE
I reviewed with pt. Failed multiple treatment modalities including PT, chiropractic treatment and pain management injections. . Breast size may be playing a role re; the chronic pain.  Will refer to Plastic Surgery for eval.

## 2024-08-27 NOTE — TELEPHONE ENCOUNTER
Received call from patient asking to tabby a consult for breast reduction.    Patient has a referral with diagnosis of Mid back pain, chronic and Chronic midline low back pain, unspecified whether sciatica present.

## 2024-08-27 NOTE — ASSESSMENT & PLAN NOTE
More recent onset compared to low back issues. Has been doing home PT exercises without improvement.  Breast size may be playing a role. Will refer pt to Plastic surgery for eval.  Continue conservative care in the interim

## 2024-09-06 ENCOUNTER — TELEPHONE (OUTPATIENT)
Dept: PLASTIC SURGERY | Facility: CLINIC | Age: 59
End: 2024-09-06

## 2024-09-12 DIAGNOSIS — N95.1 MENOPAUSAL SYMPTOMS: ICD-10-CM

## 2024-09-12 RX ORDER — ESTRADIOL 1 MG/1
1 TABLET ORAL DAILY
Qty: 30 TABLET | Refills: 5 | Status: SHIPPED | OUTPATIENT
Start: 2024-09-12

## 2024-09-23 ENCOUNTER — NURSE TRIAGE (OUTPATIENT)
Age: 59
End: 2024-09-23

## 2024-09-23 ENCOUNTER — PATIENT MESSAGE (OUTPATIENT)
Age: 59
End: 2024-09-23

## 2024-09-23 NOTE — TELEPHONE ENCOUNTER
"Patient called office stating she stating she is experiencing headaches and leg cramps for the past 2 months. She would like to know if these could be possible side effects of the Estrace and Progesterone she is on. She states she started these in February / March. She states the leg cramps are intermittent throughout the day and night, but much worse through the night. The headaches are intermittent as well. She gets about 4-5 in a week, they are on the top of her head and she take Ibuprofen which takes the pain away. Informed will route to Dr. Raven Fang for advice at this time.           Reason for Disposition   Caller has NON-URGENT medicine question about med that PCP or specialist prescribed and triager unable to answer question    Answer Assessment - Initial Assessment Questions  1. NAME of MEDICATION: \"What medicine are you calling about?\"      Estrace / progesterone   2. QUESTION: \"What is your question?\" (e.g., medication refill, side effect)      Possible side effects   3. PRESCRIBING HCP: \"Who prescribed it?\" Reason: if prescribed by specialist, call should be referred to that group.      [facility]    4. SYMPTOMS: \"Do you have any symptoms?\"      Leg cramps - intermittent throughout the day and night.  Night are worse.    Headaches - intermittent . Top of head.  Ibuprofen- takes the headache away.    Gets about 4-5 headaches a week.   5. SEVERITY: If symptoms are present, ask \"Are they mild, moderate or severe?\"      Rates cramps an 8.  Rates headaches a 3-4.    Protocols used: Medication Question Call-ADULT-[place]    "

## 2024-09-23 NOTE — TELEPHONE ENCOUNTER
Regarding: HRT side effects- calf cramps/HA  ----- Message from Ana ADAM sent at 9/23/2024  3:49 PM EDT -----  CTS RN please see patient MYC msg thread. Dr Raven Fang patient and concerned about hormone side effects- excruciating bilateral leg cramps and headaches.

## 2024-09-27 NOTE — TELEPHONE ENCOUNTER
This should have been routed to my on call team??  I replied with recommendations through [employer]

## 2024-11-19 ENCOUNTER — ANNUAL EXAM (OUTPATIENT)
Age: 59
End: 2024-11-19
Payer: COMMERCIAL

## 2024-11-19 VITALS
DIASTOLIC BLOOD PRESSURE: 78 MMHG | WEIGHT: 191.8 LBS | SYSTOLIC BLOOD PRESSURE: 124 MMHG | HEIGHT: 64 IN | BODY MASS INDEX: 32.74 KG/M2

## 2024-11-19 DIAGNOSIS — Z12.31 SCREENING MAMMOGRAM FOR BREAST CANCER: ICD-10-CM

## 2024-11-19 DIAGNOSIS — Z01.419 ENCOUNTER FOR ANNUAL ROUTINE GYNECOLOGICAL EXAMINATION: Primary | ICD-10-CM

## 2024-11-19 PROCEDURE — S0612 ANNUAL GYNECOLOGICAL EXAMINA: HCPCS | Performed by: OBSTETRICS & GYNECOLOGY

## 2024-11-19 NOTE — PROGRESS NOTES
Abdulkadir Roman   1965    CC:  Yearly exam    S:  59 y.o. female here for yearly exam. She is postmenopausal and has had no vaginal bleeding.  She denies vaginal discharge, itching, odor or dryness.     Following with [facility], on HRT.  Physically doing better.     Sexual activity: She is sexually active without pain, bleeding.  She does note dryness with intercourse, does use lubricants.  and monogamous.     She does not report urinary incontinence, urinary concerns, bowel problems, breast concerns.  Gets up once a night to void.     Last Pap: 1/19/22 - NILM, Neg HPV (CONE age 23)  Last Mammo: 6/26/24 - BIRad 2  Last Colonoscopy: 5/7/2018 - 10yr recall     We reviewed ASC guidelines for Pap testing.     Family hx of breast cancer: Mother, Cousin  Family hx of ovarian cancer: Mother  Family hx of colon cancer: no      Current Outpatient Medications:     acyclovir (ZOVIRAX) 5 % ointment, Apply topically every 3 (three) hours (6x a day), Disp: 15 g, Rfl: 0    estradiol (Estrace) 1 mg tablet, Take 1 tablet (1 mg total) by mouth daily, Disp: 30 tablet, Rfl: 5    ibuprofen (MOTRIN) 600 mg tablet, Take 200 mg by mouth if needed for mild pain or moderate pain, Disp: , Rfl:     multivitamin (THERAGRAN) TABS, Take 1 tablet by mouth daily, Disp: , Rfl:     Progesterone 100 MG CAPS, Take 100 mg by mouth at bedtime, Disp: 30 capsule, Rfl: 11    TURMERIC PO, Take by mouth, Disp: , Rfl:   Patient Active Problem List   Diagnosis    Allergic rhinitis    Disc degeneration, lumbar    Herniated cervical disc    Hyperlipidemia    Spondylosis of lumbar region without myelopathy or radiculopathy    Mid back pain, chronic    Numbness of left hand    Biceps tendinitis of right upper extremity    Chronic midline low back pain     Family History   Problem Relation Age of Onset    Breast cancer Mother 64    Diabetes Mother     Ovarian cancer Mother 36    Cancer Mother         Breast, skin    Diabetes Father     Hypertension Family  "    Breast cancer Cousin 49    No Known Problems Daughter     No Known Problems Daughter     No Known Problems Maternal Aunt     No Known Problems Maternal Aunt     No Known Problems Maternal Grandmother     No Known Problems Maternal Grandfather     No Known Problems Paternal Grandmother     No Known Problems Paternal Grandfather      Past Medical History:   Diagnosis Date    Cancer (HCC) 1992    Cervical, cone biopsy removed    Cervical cancer (HCC)     age 24    Depression     On and off, mild, situational    Lipoma     last assessed....9/17/14   resolved.....1/26/17      Vertigo         Review of Systems   Respiratory: Negative.    Cardiovascular: Negative.    Gastrointestinal: Negative for constipation and diarrhea.   Genitourinary: Negative for difficulty urinating, pelvic pain, vaginal bleeding, vaginal discharge, itching or odor.    O:  Blood pressure 124/78, height 5' 4.25\" (1.632 m), weight 87 kg (191 lb 12.8 oz), last menstrual period 05/09/2021, not currently breastfeeding.    Patient appears well and is not in distress, obese  Breasts are symmetrical without mass, tenderness, nipple discharge, skin changes or adenopathy.   Abdomen is soft and nontender without masses.   External genitals are normal without lesions or rashes.  Urethral meatus and urethra are normal  Bladder is normal to palpation  Vagina is normal without discharge or bleeding, generalized atrophic changes present   Cervix is normal without discharge or lesion.   Uterus is normal, mobile, nontender without palpable mass.  Adnexa are normal, nontender, without palpable mass.     A:  Yearly exam.     P:   Pap & HPV next year  Mammo ordered   Colon Cancer Screening up to date    Does plan follow up - to schedule with genetic counseling     Reviewed considerations of menopause, to call with any postmenopausal bleeding or other concerns.      RTO one year for yearly exam or sooner as needed.      "

## 2024-11-26 DIAGNOSIS — N95.1 MENOPAUSAL SYMPTOMS: ICD-10-CM

## 2024-11-26 RX ORDER — ESTRADIOL 1 MG/1
1 TABLET ORAL DAILY
Qty: 30 TABLET | Refills: 5 | Status: SHIPPED | OUTPATIENT
Start: 2024-11-26

## 2025-01-02 ENCOUNTER — TELEMEDICINE (OUTPATIENT)
Age: 60
End: 2025-01-02
Payer: COMMERCIAL

## 2025-01-02 DIAGNOSIS — E27.9 ADRENAL GLAND DYSFUNCTION (HCC): ICD-10-CM

## 2025-01-02 DIAGNOSIS — N95.1 MENOPAUSAL SYMPTOMS: Primary | ICD-10-CM

## 2025-01-02 PROCEDURE — 99213 OFFICE O/P EST LOW 20 MIN: CPT | Performed by: OBSTETRICS & GYNECOLOGY

## 2025-01-08 NOTE — PROGRESS NOTES
Virtual Regular Visit  Name: Abdulkadir Roman      : 1965      MRN: [id number]  Encounter Provider: Liliana Butler MD  Encounter Date: 2025   Encounter [facility]: [facility] [place]      Verification of patient location:  Patient is located at Home in the following state in which I hold an active license [place] :  Assessment & Plan  Menopausal symptoms         Adrenal gland dysfunction (HCC)         1) NAMS ( [place] [employer]) guidelines are to offer APPROPRIATE dosed HRT for the APPROPRIATE time frame as long as benefits outweigh the risks, and age is not a determining factor for stopping it. IN other words, as long as the benefits outweigh the risks, may be on this indefinitely. She is fine continuing with current regimen, will call when refills are needed  2) Calf cramping not a side effect or mediated by HRT. I think of foot wear, other stressors on legs, adequate hydration. Yes, heat and magnesium should be helpful  3) Follow up prn or one year.     This was a 50 minute visit with greater than 50% of time spent in face to face counseling and coordination of care.      Encounter provider Liliana Butler MD    The patient was identified by name and date of birth. Abdulkadir Roman was informed that this is a telemedicine visit and that the visit is being conducted through the Epic Embedded platform. She agrees to proceed..  My office door was closed. No one else was in the room.  She acknowledged consent and understanding of privacy and security of the video platform. The patient has agreed to participate and understands they can discontinue the visit at any time.    Patient is aware this is a billable service.         Abdulkadir returns for hormone consult follow up. I saw her last in  with typical menopausal complaints, hot flash, sleep disturbance, decreased libido.   I started her on oral E2 1 mg/ mg nightly.   Adrenal labs drawn in  "3/24, noting \"adequate\" testosterone 31, DHEAS suboptimal 64, 100-150 preferred. Offered DHEA 10 mg for energy. Since then:  1) Decreased estradiol to half pill 0.5 mg and feels fine on this  2) Started having calf cramps at night. Stopped DHEA, no effect. Tried magnesium per Dr. Rosio timmons which also did not really help, but these have gone down on their own.  3) Wants to know where to go from here: should she stop HRT? When?  NO new medical updates or family history change.     Review of Systems   Constitutional: Negative.    Gastrointestinal: Negative.    Endocrine: Negative.    Genitourinary: Negative.    Musculoskeletal:  Positive for myalgias.   Skin: Negative.    Neurological: Negative.    Psychiatric/Behavioral: Negative.     "

## 2025-01-22 DIAGNOSIS — N95.1 MENOPAUSAL SYMPTOMS: ICD-10-CM

## 2025-01-22 RX ORDER — PROGESTERONE 100 MG/1
100 CAPSULE ORAL
Qty: 30 CAPSULE | Refills: 11 | Status: SHIPPED | OUTPATIENT
Start: 2025-01-22

## 2025-01-23 DIAGNOSIS — N95.1 MENOPAUSAL SYMPTOMS: Primary | ICD-10-CM

## 2025-01-23 RX ORDER — ESTRADIOL 0.04 MG/D
1 PATCH, EXTENDED RELEASE TRANSDERMAL 2 TIMES WEEKLY
Qty: 8 PATCH | Refills: 11 | Status: SHIPPED | OUTPATIENT
Start: 2025-01-23

## 2025-02-18 DIAGNOSIS — N95.1 MENOPAUSAL SYMPTOMS: ICD-10-CM

## 2025-02-19 DIAGNOSIS — N95.1 MENOPAUSAL SYMPTOMS: Primary | ICD-10-CM

## 2025-02-19 RX ORDER — PROGESTERONE 200 MG/1
200 CAPSULE ORAL
Qty: 30 CAPSULE | Refills: 11 | Status: SHIPPED | OUTPATIENT
Start: 2025-02-19

## 2025-02-19 RX ORDER — PROGESTERONE 100 MG/1
100 CAPSULE ORAL
Qty: 30 CAPSULE | Refills: 0 | OUTPATIENT
Start: 2025-02-19

## 2025-03-20 DIAGNOSIS — N95.1 MENOPAUSAL SYMPTOMS: ICD-10-CM

## 2025-03-21 RX ORDER — PROGESTERONE 200 MG/1
200 CAPSULE ORAL
Qty: 30 CAPSULE | Refills: 0 | OUTPATIENT
Start: 2025-03-21

## 2025-07-14 ENCOUNTER — TELEPHONE (OUTPATIENT)
Age: 60
End: 2025-07-14

## (undated) DEVICE — GLOVE SRG BIOGEL 6.5

## (undated) DEVICE — SUT PROLENE 4-0 PS-2 18 IN 8682G

## (undated) DEVICE — GLOVE SRG BIOGEL 7

## (undated) DEVICE — MINI BLADE ROUND TIP SHARP ON ONE SIDE

## (undated) DEVICE — STRETCH BANDAGE: Brand: CURITY

## (undated) DEVICE — ACE WRAP 3 IN STERILE

## (undated) DEVICE — OCCLUSIVE GAUZE STRIP,3% BISMUTH TRIBROMOPHENATE IN PETROLATUM BLEND: Brand: XEROFORM

## (undated) DEVICE — DISPOSABLE EQUIPMENT COVER: Brand: SMALL TOWEL DRAPE

## (undated) DEVICE — SPONGE PVP SCRUB WING STERILE

## (undated) DEVICE — GAUZE SPONGES,USP TYPE VII GAUZE, 12 PLY: Brand: CURITY

## (undated) DEVICE — CUFF TOURNIQUET 18 X 4 IN QUICK CONNECT DISP 1 BLADDER

## (undated) DEVICE — IMPERVIOUS STOCKINETTE: Brand: DEROYAL

## (undated) DEVICE — INTENDED FOR TISSUE SEPARATION, AND OTHER PROCEDURES THAT REQUIRE A SHARP SURGICAL BLADE TO PUNCTURE OR CUT.: Brand: BARD-PARKER ® CARBON RIB-BACK BLADES

## (undated) DEVICE — STERILE BETHLEHEM PLASTIC HAND: Brand: CARDINAL HEALTH

## (undated) DEVICE — GLOVE INDICATOR PI UNDERGLOVE SZ 7 BLUE

## (undated) DEVICE — GLOVE INDICATOR PI UNDERGLOVE SZ 6.5 BLUE